# Patient Record
Sex: FEMALE | Race: BLACK OR AFRICAN AMERICAN | NOT HISPANIC OR LATINO | Employment: UNEMPLOYED | URBAN - METROPOLITAN AREA
[De-identification: names, ages, dates, MRNs, and addresses within clinical notes are randomized per-mention and may not be internally consistent; named-entity substitution may affect disease eponyms.]

---

## 2023-12-14 ENCOUNTER — HOSPITAL ENCOUNTER (EMERGENCY)
Facility: HOSPITAL | Age: 23
Discharge: HOME | End: 2023-12-15

## 2023-12-14 VITALS
HEIGHT: 66 IN | OXYGEN SATURATION: 99 % | TEMPERATURE: 97.2 F | SYSTOLIC BLOOD PRESSURE: 115 MMHG | RESPIRATION RATE: 16 BRPM | HEART RATE: 92 BPM | DIASTOLIC BLOOD PRESSURE: 66 MMHG | BODY MASS INDEX: 28.77 KG/M2 | WEIGHT: 179.01 LBS

## 2023-12-14 DIAGNOSIS — N30.00 ACUTE CYSTITIS WITHOUT HEMATURIA: Primary | ICD-10-CM

## 2023-12-14 DIAGNOSIS — Z11.3 SCREENING EXAMINATION FOR STD (SEXUALLY TRANSMITTED DISEASE): ICD-10-CM

## 2023-12-14 LAB — PREGNANCY TEST URINE, POC: NEGATIVE

## 2023-12-14 PROCEDURE — 81001 URINALYSIS AUTO W/SCOPE: CPT

## 2023-12-14 PROCEDURE — 81025 URINE PREGNANCY TEST: CPT

## 2023-12-14 PROCEDURE — 99284 EMERGENCY DEPT VISIT MOD MDM: CPT

## 2023-12-14 PROCEDURE — 99283 EMERGENCY DEPT VISIT LOW MDM: CPT

## 2023-12-14 PROCEDURE — 87086 URINE CULTURE/COLONY COUNT: CPT

## 2023-12-14 ASSESSMENT — COLUMBIA-SUICIDE SEVERITY RATING SCALE - C-SSRS
6. HAVE YOU EVER DONE ANYTHING, STARTED TO DO ANYTHING, OR PREPARED TO DO ANYTHING TO END YOUR LIFE?: NO
1. IN THE PAST MONTH, HAVE YOU WISHED YOU WERE DEAD OR WISHED YOU COULD GO TO SLEEP AND NOT WAKE UP?: NO
2. HAVE YOU ACTUALLY HAD ANY THOUGHTS OF KILLING YOURSELF?: NO

## 2023-12-15 LAB
APPEARANCE UR: ABNORMAL
BACTERIA #/AREA URNS AUTO: ABNORMAL /HPF
BILIRUB UR STRIP.AUTO-MCNC: NEGATIVE MG/DL
C TRACH RRNA SPEC QL NAA+PROBE: POSITIVE
CLUE CELLS SPEC QL WET PREP: NORMAL
COLOR UR: YELLOW
GLUCOSE UR STRIP.AUTO-MCNC: NEGATIVE MG/DL
HCV AB SER QL: NONREACTIVE
HIV 1+2 AB+HIV1 P24 AG SERPL QL IA: NONREACTIVE
HOLD SPECIMEN: NORMAL
KETONES UR STRIP.AUTO-MCNC: NEGATIVE MG/DL
LEUKOCYTE ESTERASE UR QL STRIP.AUTO: NEGATIVE
MUCOUS THREADS #/AREA URNS AUTO: ABNORMAL /LPF
N GONORRHOEA DNA SPEC QL PROBE+SIG AMP: NEGATIVE
NITRITE UR QL STRIP.AUTO: POSITIVE
PH UR STRIP.AUTO: 6 [PH]
PROT UR STRIP.AUTO-MCNC: NEGATIVE MG/DL
RBC # UR STRIP.AUTO: NEGATIVE /UL
RBC #/AREA URNS AUTO: ABNORMAL /HPF
SP GR UR STRIP.AUTO: 1.02
SQUAMOUS #/AREA URNS AUTO: ABNORMAL /HPF
T PALLIDUM AB SER QL: NONREACTIVE
T VAGINALIS SPEC QL WET PREP: NORMAL
TRICHOMONAS REFLEX COMMENT: NORMAL
UROBILINOGEN UR STRIP.AUTO-MCNC: 2 MG/DL
WBC #/AREA URNS AUTO: ABNORMAL /HPF
WBC VAG QL WET PREP: NORMAL
YEAST VAG QL WET PREP: NORMAL

## 2023-12-15 PROCEDURE — 86780 TREPONEMA PALLIDUM: CPT

## 2023-12-15 PROCEDURE — 87800 DETECT AGNT MULT DNA DIREC: CPT

## 2023-12-15 PROCEDURE — 86803 HEPATITIS C AB TEST: CPT

## 2023-12-15 PROCEDURE — 36415 COLL VENOUS BLD VENIPUNCTURE: CPT

## 2023-12-15 PROCEDURE — 87661 TRICHOMONAS VAGINALIS AMPLIF: CPT

## 2023-12-15 PROCEDURE — 87210 SMEAR WET MOUNT SALINE/INK: CPT

## 2023-12-15 PROCEDURE — 87389 HIV-1 AG W/HIV-1&-2 AB AG IA: CPT

## 2023-12-15 RX ORDER — NITROFURANTOIN 25; 75 MG/1; MG/1
100 CAPSULE ORAL 2 TIMES DAILY
Qty: 10 CAPSULE | Refills: 0 | Status: SHIPPED | OUTPATIENT
Start: 2023-12-15 | End: 2023-12-20

## 2023-12-15 NOTE — ED PROVIDER NOTES
"HPI    CC: STD concerns  HPI:   Tiburcio Roman is a 23 y.o.   female presenting with complaints of diffuse cramping lower abdominal pain and concerns for STD's. Patient report abdominal pain radiates into bilateral flanks that onset earlier this morning.  No associated fever, chills, diarrhea, constipation, dysuria, urinary frequency or urgency, changes in vaginal discharge, vaginal pain or itching.  No new genital lesions.  Patient is currently in a monogamous relationship with male partner but has been sexually active and therefore is requesting full STD testing with blood work as she believes \"men are disgusting and he could have been unfaithful\".  Reports remote history of gonorrhea/chlamydia 7 years ago which was successfully treated with antibiotics.  She also has a history of pyelonephritis 7 years ago and states current pain does not feel similar to this.  Her pain is not postprandial in nature and is not exacerbated by movement, urination or defecation.      PMHx:  PSH: reviewed per EMR  Allergies: Reviewed per EMR  Medications: Reviewed per EMR  FH: Noncontributory  Social Hx: Denies tobacco. Denies EtOH, illicit substance use.      ROS: All other systems were reviewed and negative.    Physical Exam:   GENERAL: Patient is well-appearing, cooperative. Vitals noted, NAD. Afebrile  HEAD: NC/AT. Neck is supple, full ROM intact.  EENMT: PERRL. EOMI, anicteric sclera. Hearing grossly intact. MMM. Normal phonation.  CV: Regular rate & rhythm. S1/S2 present. No murmur, gallops or rubs.  PULM: CTAB with normal respiratory effort. No wheezes, rales or rhonchi.  ABDOMEN: Soft, NTTP, non-distended. No peritoneal signs. BS+ x 4. No HSM or pulsatile masses.  : No CVA tenderness or reproducible suprapubic pain. Chaperone present for pelvic exam.      External exam unremarkable, no evidence of external excoriation, rash, lesions, or vesicles.       Internal speculum exam reveals closed os with scant vaginal " discharge within vaginal canal. No active bleeding, edema or mucopurulent discharge. Mucosa uniformly pink without      internal lesions or vesicles.      Bimanual is normal without adnexal pain, masses or cervical motion tenderness. No inguinal LAD.  EXTREMITIES: WWP, 2+ bilateral RPs and DPPs. No pitting LE edema.  SKIN: Intact. No rash or lesions. Cap refill <2s.  NEURO: AA&Ox3, Speech fluent. No focal deficits identified. Normal gait, Spontaneously ENNIS x 4. Answering questions appropriately.     -------------------------------------------------------------------------------------------  ED Course & MDM   Triage notes and vital signs were reviewed. History and physical exam were performed. I also reviewed recent and relevant outside records for thorough HPI.    Medical Decision Making  This is a 23 year old  female presenting with abdominal cramping radiating into bilateral flanks and concerns for pregnancy. Patient also expressed concerns for possible STD exposure and is therefore requesting STI testing today while she is here. Aside from pelvic pain she is currently asymptomatic and in a monogamous relationship with male partner. VSS on arrival. Patient is non-toxic appearing and in no apparent distress. Negative CVAT and no suprapubic pain to palpation. Based on clinical presentation, patient's complaints are most concerning for gynecological/genitourinary pathology. Low concern for intraabdominal process given benign abdomen and absence of systemic complaints. Will obtain UA, pelvic swabs for STI's and additional blood work for HIV, Hep-C and Syphillis. Patient is not interested in empiric treatment for STI's at this point in time.    Amount and/or Complexity of Data Reviewed  Labs: ordered.     Details: UA appears infected given presence of nitrite, WBC and 1+ bacteria on microscopy. Patient was informed and will be discharge home with prescription for Macrobid.    Risk  Prescription drug  management.      Differential Diagnoses Considered include: UTI, STI cervicitis, BV, yeast vaginosis, IUP vs. Ectopic    ED course:  Patient was informed that urine pregnancy is negative, therefore low suspicion for ectopic at this time.  Pelvic swabs for wet prep and GC/chlamydia were obtained. She is not interested in empiric STD treatment. UA appeared infected given presence of nitrite and WBC with 1+ bacteria seen on microscopy. She was informed and will be discharged home with prescription for Macrobid. Patient does not wish to wait for wet-prep results and is requesting discharge at this point in time. She was advised to avoid sexual intercourse until notified about STI results and if positive, wait for 1 week following treatment completion to avoid reinfection. Patient voiced understanding. ED return precautions were discussed and provided at time of discharge. Patient acknowledged their understanding and is amendable to the treatment plan. All discharge instructions explained to patient and all questions were answered.    Diagnoses as of 12/15/23 0043   Acute cystitis without hematuria   Screening examination for STD (sexually transmitted disease)            Disposition: Homgoing    Audrey Baker PA-C  [facility]    Disclaimer: This note was dictated by speech recognition. Minor errors in transcription may be present. Please call if questions.  -------------------------------------------------------------------------------------------       Audrey Baker PA-C  12/27/23 1875

## 2023-12-16 ENCOUNTER — TELEPHONE (OUTPATIENT)
Dept: PHARMACY | Facility: HOSPITAL | Age: 23
End: 2023-12-16

## 2023-12-16 DIAGNOSIS — A74.9 CHLAMYDIA: Primary | ICD-10-CM

## 2023-12-16 RX ORDER — DOXYCYCLINE 100 MG/1
100 CAPSULE ORAL 2 TIMES DAILY
Qty: 14 CAPSULE | Refills: 0 | Status: SHIPPED | OUTPATIENT
Start: 2023-12-16 | End: 2023-12-23

## 2023-12-16 NOTE — PROGRESS NOTES
EDPD Note: Initiation     Contacted Tiburcio VIVI Abel regarding a positive Chlamydia Trachomatis culture that was taken during their recent emergency room visit. I completed education with patient . The patient is not being treated appropriately. The following prescription was sent to the patient's preferred pharmacy. No further follow up needed from EDPD Team.     Drug: Doxycycline 100 mg  Si PO BID  Qty: 14 Tablets  Days Supply: 7 days    If there are any other questions for the ED Post-Discharge Culture Follow Up Team, please contact [phone]. Fax: [phone].    Pierre Dempsey, PharmD

## 2023-12-17 LAB — BACTERIA UR CULT: ABNORMAL

## 2023-12-18 LAB — T VAGINALIS RRNA SPEC QL NAA+PROBE: NEGATIVE

## 2023-12-19 ENCOUNTER — TELEPHONE (OUTPATIENT)
Dept: PHARMACY | Facility: HOSPITAL | Age: 23
End: 2023-12-19

## 2023-12-19 NOTE — PROGRESS NOTES
EDPD Note: Antibiotics Reviewed and Warranted    Contacted Mr./Mrs./Ms. Tiburcio Roman regarding a positive urine culture/result that was taken during their recent emergency room visit. I completed education with patient . The patient is being treated appropriately with Macrobid 100mg   (1 capsule PO BID x5 days).     Patient reports her Sx have very much improved. Counseled to complete full course of treatment, and to follow-up with PCP/UC/ER if not fully resolved upon completion of therapy.    Patient expressed understanding and had no further questions.    Susceptibility data from last 90 days.  Collected Specimen Info Organism Ampicillin Cefazolin Cefazolin (uncomplicated UTIs only) Ciprofloxacin Gentamicin Nitrofurantoin Piperacillin/Tazobactam Trimethoprim/Sulfamethoxazole   12/14/23 Urine from Clean Catch/Voided Escherichia coli S S S S S S S S        No further follow up needed from EDPD Team.     Ryanne Gómez [facility]

## 2024-02-03 ENCOUNTER — APPOINTMENT (OUTPATIENT)
Dept: CARDIOLOGY | Facility: HOSPITAL | Age: 24
End: 2024-02-03

## 2024-02-03 ENCOUNTER — HOSPITAL ENCOUNTER (EMERGENCY)
Facility: HOSPITAL | Age: 24
Discharge: HOME | End: 2024-02-03

## 2024-02-03 VITALS
WEIGHT: 130 LBS | OXYGEN SATURATION: 100 % | RESPIRATION RATE: 14 BRPM | SYSTOLIC BLOOD PRESSURE: 95 MMHG | DIASTOLIC BLOOD PRESSURE: 59 MMHG | BODY MASS INDEX: 20.89 KG/M2 | HEIGHT: 66 IN | TEMPERATURE: 98.2 F | HEART RATE: 89 BPM

## 2024-02-03 DIAGNOSIS — R55 NEAR SYNCOPE: Primary | ICD-10-CM

## 2024-02-03 DIAGNOSIS — E16.2 LOW BLOOD SUGAR: ICD-10-CM

## 2024-02-03 PROBLEM — F32.A DEPRESSION: Status: ACTIVE | Noted: 2024-02-03

## 2024-02-03 PROBLEM — F43.10 COMPLEX POSTTRAUMATIC STRESS DISORDER: Status: ACTIVE | Noted: 2024-02-03

## 2024-02-03 LAB
ALBUMIN SERPL BCP-MCNC: 3.6 G/DL (ref 3.4–5)
ALP SERPL-CCNC: 53 U/L (ref 33–110)
ALT SERPL W P-5'-P-CCNC: 7 U/L (ref 7–45)
AMPHETAMINES UR QL SCN: ABNORMAL
ANION GAP SERPL CALC-SCNC: 15 MMOL/L (ref 10–20)
APPEARANCE UR: CLEAR
AST SERPL W P-5'-P-CCNC: 12 U/L (ref 9–39)
BARBITURATES UR QL SCN: ABNORMAL
BASOPHILS # BLD AUTO: 0.04 X10*3/UL (ref 0–0.1)
BASOPHILS NFR BLD AUTO: 0.6 %
BENZODIAZ UR QL SCN: ABNORMAL
BILIRUB SERPL-MCNC: 0.4 MG/DL (ref 0–1.2)
BILIRUB UR STRIP.AUTO-MCNC: NEGATIVE MG/DL
BUN SERPL-MCNC: 10 MG/DL (ref 6–23)
BZE UR QL SCN: ABNORMAL
C TRACH RRNA SPEC QL NAA+PROBE: NEGATIVE
CALCIUM SERPL-MCNC: 8.5 MG/DL (ref 8.6–10.3)
CANNABINOIDS UR QL SCN: ABNORMAL
CHLORIDE SERPL-SCNC: 106 MMOL/L (ref 98–107)
CO2 SERPL-SCNC: 21 MMOL/L (ref 21–32)
COLOR UR: YELLOW
CREAT SERPL-MCNC: 0.71 MG/DL (ref 0.5–1.05)
EGFRCR SERPLBLD CKD-EPI 2021: >90 ML/MIN/1.73M*2
EOSINOPHIL # BLD AUTO: 0.17 X10*3/UL (ref 0–0.7)
EOSINOPHIL NFR BLD AUTO: 2.6 %
ERYTHROCYTE [DISTWIDTH] IN BLOOD BY AUTOMATED COUNT: 13.2 % (ref 11.5–14.5)
FENTANYL+NORFENTANYL UR QL SCN: ABNORMAL
GLUCOSE BLD MANUAL STRIP-MCNC: 114 MG/DL (ref 74–99)
GLUCOSE SERPL-MCNC: 70 MG/DL (ref 74–99)
GLUCOSE UR STRIP.AUTO-MCNC: NEGATIVE MG/DL
HCG UR QL IA.RAPID: NEGATIVE
HCT VFR BLD AUTO: 35.4 % (ref 36–46)
HGB BLD-MCNC: 11.7 G/DL (ref 12–16)
IMM GRANULOCYTES # BLD AUTO: 0.02 X10*3/UL (ref 0–0.7)
IMM GRANULOCYTES NFR BLD AUTO: 0.3 % (ref 0–0.9)
KETONES UR STRIP.AUTO-MCNC: NEGATIVE MG/DL
LEUKOCYTE ESTERASE UR QL STRIP.AUTO: NEGATIVE
LYMPHOCYTES # BLD AUTO: 3.49 X10*3/UL (ref 1.2–4.8)
LYMPHOCYTES NFR BLD AUTO: 53 %
MCH RBC QN AUTO: 29.3 PG (ref 26–34)
MCHC RBC AUTO-ENTMCNC: 33.1 G/DL (ref 32–36)
MCV RBC AUTO: 89 FL (ref 80–100)
MONOCYTES # BLD AUTO: 0.48 X10*3/UL (ref 0.1–1)
MONOCYTES NFR BLD AUTO: 7.3 %
N GONORRHOEA DNA SPEC QL PROBE+SIG AMP: NEGATIVE
NEUTROPHILS # BLD AUTO: 2.39 X10*3/UL (ref 1.2–7.7)
NEUTROPHILS NFR BLD AUTO: 36.2 %
NITRITE UR QL STRIP.AUTO: NEGATIVE
NRBC BLD-RTO: 0 /100 WBCS (ref 0–0)
OPIATES UR QL SCN: ABNORMAL
OXYCODONE+OXYMORPHONE UR QL SCN: ABNORMAL
PCP UR QL SCN: ABNORMAL
PH UR STRIP.AUTO: 5 [PH]
PLATELET # BLD AUTO: 267 X10*3/UL (ref 150–450)
POTASSIUM SERPL-SCNC: 3.7 MMOL/L (ref 3.5–5.3)
PROT SERPL-MCNC: 6.8 G/DL (ref 6.4–8.2)
PROT UR STRIP.AUTO-MCNC: NEGATIVE MG/DL
RBC # BLD AUTO: 4 X10*6/UL (ref 4–5.2)
RBC # UR STRIP.AUTO: NEGATIVE /UL
SODIUM SERPL-SCNC: 138 MMOL/L (ref 136–145)
SP GR UR STRIP.AUTO: 1.01
UROBILINOGEN UR STRIP.AUTO-MCNC: <2 MG/DL
WBC # BLD AUTO: 6.6 X10*3/UL (ref 4.4–11.3)

## 2024-02-03 PROCEDURE — 81003 URINALYSIS AUTO W/O SCOPE: CPT | Performed by: EMERGENCY MEDICINE

## 2024-02-03 PROCEDURE — 93005 ELECTROCARDIOGRAM TRACING: CPT

## 2024-02-03 PROCEDURE — 81025 URINE PREGNANCY TEST: CPT | Performed by: EMERGENCY MEDICINE

## 2024-02-03 PROCEDURE — 36415 COLL VENOUS BLD VENIPUNCTURE: CPT | Performed by: PHYSICIAN ASSISTANT

## 2024-02-03 PROCEDURE — 2500000004 HC RX 250 GENERAL PHARMACY W/ HCPCS (ALT 636 FOR OP/ED): Performed by: PHYSICIAN ASSISTANT

## 2024-02-03 PROCEDURE — 99283 EMERGENCY DEPT VISIT LOW MDM: CPT | Mod: 25

## 2024-02-03 PROCEDURE — 99284 EMERGENCY DEPT VISIT MOD MDM: CPT | Mod: 25

## 2024-02-03 PROCEDURE — 80307 DRUG TEST PRSMV CHEM ANLYZR: CPT | Performed by: PHYSICIAN ASSISTANT

## 2024-02-03 PROCEDURE — 84075 ASSAY ALKALINE PHOSPHATASE: CPT | Performed by: PHYSICIAN ASSISTANT

## 2024-02-03 PROCEDURE — 96360 HYDRATION IV INFUSION INIT: CPT

## 2024-02-03 PROCEDURE — 82947 ASSAY GLUCOSE BLOOD QUANT: CPT

## 2024-02-03 PROCEDURE — 85025 COMPLETE CBC W/AUTO DIFF WBC: CPT | Performed by: PHYSICIAN ASSISTANT

## 2024-02-03 PROCEDURE — 87800 DETECT AGNT MULT DNA DIREC: CPT | Mod: AHULAB | Performed by: PHYSICIAN ASSISTANT

## 2024-02-03 RX ADMIN — SODIUM CHLORIDE 1000 ML: 9 INJECTION, SOLUTION INTRAVENOUS at 05:14

## 2024-02-03 ASSESSMENT — PAIN SCALES - GENERAL
PAINLEVEL_OUTOF10: 0 - NO PAIN

## 2024-02-03 ASSESSMENT — COLUMBIA-SUICIDE SEVERITY RATING SCALE - C-SSRS
6. HAVE YOU EVER DONE ANYTHING, STARTED TO DO ANYTHING, OR PREPARED TO DO ANYTHING TO END YOUR LIFE?: NO
2. HAVE YOU ACTUALLY HAD ANY THOUGHTS OF KILLING YOURSELF?: NO
1. IN THE PAST MONTH, HAVE YOU WISHED YOU WERE DEAD OR WISHED YOU COULD GO TO SLEEP AND NOT WAKE UP?: NO

## 2024-02-03 ASSESSMENT — PAIN - FUNCTIONAL ASSESSMENT
PAIN_FUNCTIONAL_ASSESSMENT: 0-10
PAIN_FUNCTIONAL_ASSESSMENT: 0-10

## 2024-02-03 NOTE — ED TRIAGE NOTES
Pt arrives to ED via EMS with c/o near syncopal episode at work. Endorses dizziness and nausea currently. Pt slurring words during triage, denies etoh use tonight however does endorse marijuana use around 1800 and state she does not usually use marijuana. Pt denies hitting head, CP, SOB, numbness/tingling, HA.

## 2024-02-03 NOTE — ED PROVIDER NOTES
Chief Complaint   Patient presents with   • near syncope     HPI:   Tiburcio Roman is an 23 y.o. female with history of PTSD that presents to the ED via EMS for complaint of near syncopal episode while at work.  Patient states that she felt lightheaded and nauseous.  She went to the break room and said she felt like she was going to pass out.  She denies syncope.  Denies alcohol use.  Admits to using marijuana earlier today.  Denies chest pain or shortness of breath prior to or after the syncopal episode.  Denies tunnel vision.  Denies headache, vision changes, double vision, blurred vision, speech changes, chest pain, shortness of breath, neck pain, fever, chills, abdominal pain, dysuria, hematuria.  LMP 12/24/2023.  She says they are irregular due to her birth control.  Denies chance of pregnancy.  Is requesting to be tested for chlamydia to ensure test of cure following treatment last month.  Denies trauma or injury.  Says she does not think she ate or drank enough today.    Medications:  Soc HX: Weekly alcohol use.  Occasional black and mild use.  Frequent marijuana use.  Have 2 children  No Known Allergies:   Past Medical History:   Diagnosis Date   • Abdominal tenderness, unspecified site 01/24/2020    Abdominal tenderness   • Acute vaginitis 05/23/2020    Bacterial vaginosis   • Encounter for gynecological examination (general) (routine) without abnormal findings 07/22/2019    Encounter for well woman exam with routine gynecological exam   • Encounter for immunization 04/13/2018    Immunization due   • Encounter for other general counseling and advice on contraception 01/15/2019    Encounter for counseling regarding contraception   • Encounter for pregnancy test, result negative 01/15/2019    Urine pregnancy test negative   • Encounter for pregnancy test, result positive 03/06/2020    Positive pregnancy test   • Encounter for screening for diabetes mellitus 07/09/2020    Screening for diabetes mellitus  (DM)   • Encounter for screening for infections with a predominantly sexual mode of transmission 06/03/2020    Routine screening for STI (sexually transmitted infection)   • Encounter for screening for infections with a predominantly sexual mode of transmission 09/12/2019    Screen for STD (sexually transmitted disease)   • Encounter for surveillance of implantable subdermal contraceptive 11/13/2018    Nexplanon removal   • Encounter for surveillance of injectable contraceptive 07/22/2019    Encounter for Depo-Provera contraception   • Other conditions influencing health status 04/13/2018    Encounter for contraceptive management, unspecified contraceptive encounter type   • Other problems related to lifestyle 11/13/2018    Other problems related to lifestyle   • Person with feared health complaint in whom no diagnosis is made 01/24/2020    Concern about STD in female without diagnosis   • Personal history of other diseases of the digestive system 07/09/2020    History of constipation   • Personal history of other diseases of the female genital tract 05/20/2020    History of vaginal discharge   • Personal history of other diseases of the female genital tract     History of vaginal bleeding   • Personal history of other diseases of the female genital tract 01/24/2020    History of abnormal uterine bleeding   • Personal history of other endocrine, nutritional and metabolic disease 03/05/2019    History of vitamin D deficiency   • Personal history of other infectious and parasitic diseases 03/05/2019    History of trichomoniasis   • Personal history of other infectious and parasitic diseases     History of gonorrhea   • Personal history of other mental and behavioral disorders     History of depression   • Personal history of other mental and behavioral disorders 03/05/2019    History of depression   • Personal history of urinary (tract) infections 04/13/2018    History of urinary tract infection     History reviewed.  No pertinent surgical history.  No family history on file.     Physical Exam  Vitals and nursing note reviewed.   Constitutional:       General: She is not in acute distress.     Appearance: Normal appearance. She is normal weight. She is not ill-appearing or toxic-appearing.   HENT:      Head: Normocephalic and atraumatic.      Right Ear: External ear normal.      Left Ear: External ear normal.      Nose: Nose normal.      Mouth/Throat:      Mouth: Mucous membranes are moist.      Pharynx: No oropharyngeal exudate or posterior oropharyngeal erythema.   Eyes:      Extraocular Movements: Extraocular movements intact.      Pupils: Pupils are equal, round, and reactive to light.      Comments: No pain or dizziness with eye movement.  No nystagmus..  Normal test of skew   Cardiovascular:      Rate and Rhythm: Normal rate and regular rhythm.      Pulses: Normal pulses.      Heart sounds: No murmur heard.  Pulmonary:      Effort: Pulmonary effort is normal. No respiratory distress.      Breath sounds: Normal breath sounds.   Abdominal:      General: Bowel sounds are normal.      Palpations: Abdomen is soft.      Tenderness: There is no abdominal tenderness. There is no guarding or rebound.   Musculoskeletal:         General: No deformity or signs of injury. Normal range of motion.      Cervical back: Normal range of motion.   Lymphadenopathy:      Cervical: No cervical adenopathy.   Skin:     General: Skin is warm and dry.      Capillary Refill: Capillary refill takes less than 2 seconds.      Findings: No bruising or rash.   Neurological:      General: No focal deficit present.      Mental Status: She is alert.      Cranial Nerves: No cranial nerve deficit.      Sensory: No sensory deficit.      Motor: No weakness.      Coordination: Coordination normal.      Comments: NIH 0.  Van negative.  Normal finger-to-nose testing.   Psychiatric:         Mood and Affect: Mood normal.         Behavior: Behavior normal.     VS: As  documented in the triage note and EMR flowsheet from this visit were reviewed.    EKG INTERPRETATION:      Personally Reviewed      Rhythm:  Normal sinus rhythm      Rate: 73 bpm      Axis: Normal axis      Intervals:  Normal OK interval 184 ms     QRS Complex:  Normal      ST Segment: T wave inversion V2     QT Interval: QTc 440 ms      Compared with Prior: No MUSE       Medical Decision Making:   ED Course as of 02/03/24 0554   Sat Feb 03, 2024   0443 Vitals Reviewed: Afebrile. Normotensive. Not tachycardic nor tachypneic. No hypoxia.   [KA]   0504 Patient is a well-appearing 23-year-old female presents to the ED for near syncope.  EOMI.  PERRL.  LCTA.  RRR.  Abdomen soft, nontender, nondistended.  Symmetrical strength and sensation bilaterally.  No focal deficits.  Do not suspect central etiology.  Will obtain labs to assess for dehydration, electrolyte abnormality, anemia.  Urine hCG negative.  Patient to be given a bolus of normal saline.  She is requesting to be tested for gonorrhea and chlamydia to ensure test of cure because she was treated for chlamydia last month. [KA]   0550 I personally reviewed labs.  CBC shows normocytic anemia.  hCG negative.  CMP shows blood glucose of 70; likely that this may be the etiology of her symptoms.  BUN to creatinine ratio is 14, normal.  Renal function normal.  Electrolytes normal.  Tox screen positive for cannabinoids.  hCG negative.  Patient given aquilino crackers and juice.  Does report feeling better following IV fluids.  Discussed lab findings with her.  Recommended she stay well-hydrated, get plenty of rest.  Provided with work note. [KA]      ED Course User Index  [KA] Bertha Montoya PA-C         Diagnoses as of 02/03/24 0554   Near syncope   Low blood sugar   Escalation of Care: Appropriate for outpatient management     Social Determinants of Health: Limited financial resources, only has Medicaid for her children, not herself so she says she cannot see primary  care providers in she has to pay $80 every time she goes to the hospital which is too expensive for her.  Food insecurity  Counseling: Spoke with the patient and discussed today´s findings, in addition to providing specific details for the plan of care and expected course.  Patient was given the opportunity to ask questions.    Discussed return precautions and importance of follow-up.  Advised to follow-up with ECP.  Advised to return to the ED for changing or worsening symptoms, new symptoms, complaint specific precautions, and precautions listed on the discharge paperwork.  Educated on the common potential side effects of medications prescribed.    I advised the patient that the emergency evaluation and treatment provided today doesn't end their need for medical care. It is very important that they follow-up with their primary care provider or other specialist as instructed.    The plan of care was mutually agreed upon with the patient. The patient and/or family were given the opportunity to ask questions. All questions asked today in the ED were answered to the best of my ability with today's information.    I specifically advised the patient to return to the ED for changing or worsening symptoms, worrisome new symptoms, or for any complaint specific precautions listed on the discharge paperwork.    This patient was cared for in the setting of nationwide stress on resources and staffing.    This report was transcribed using voice recognition software.  Every effort was made to ensure accuracy, however, inadvertently computerized transcription errors may be present.       Bertha Montoya PA-C  02/03/24 0591

## 2024-02-03 NOTE — Clinical Note
Tiburcio Roman was seen and treated in our emergency department on 2/3/2024.  She may return to work on 02/05/2024.       If you have any questions or concerns, please don't hesitate to call.      Bertha Montoya PA-C

## 2024-02-03 NOTE — DISCHARGE INSTRUCTIONS
Follow-up with primary care provider.  Make sure that you eat light snacks throughout the day at work so that this does not happen again.  Stay well-hydrated.  Get plenty of rest.  Return to ER for any new or worsening symptoms.

## 2024-02-06 LAB
ATRIAL RATE: 73 BPM
P AXIS: 74 DEGREES
P OFFSET: 183 MS
P ONSET: 129 MS
PR INTERVAL: 184 MS
Q ONSET: 221 MS
QRS COUNT: 12 BEATS
QRS DURATION: 92 MS
QT INTERVAL: 400 MS
QTC CALCULATION(BAZETT): 440 MS
QTC FREDERICIA: 427 MS
R AXIS: 41 DEGREES
T AXIS: 56 DEGREES
T OFFSET: 421 MS
VENTRICULAR RATE: 73 BPM

## 2024-07-13 ENCOUNTER — HOSPITAL ENCOUNTER (EMERGENCY)
Facility: HOSPITAL | Age: 24
Discharge: HOME | End: 2024-07-13
Attending: STUDENT IN AN ORGANIZED HEALTH CARE EDUCATION/TRAINING PROGRAM
Payer: COMMERCIAL

## 2024-07-13 VITALS
DIASTOLIC BLOOD PRESSURE: 64 MMHG | BODY MASS INDEX: 19.62 KG/M2 | SYSTOLIC BLOOD PRESSURE: 105 MMHG | WEIGHT: 125 LBS | RESPIRATION RATE: 16 BRPM | HEART RATE: 88 BPM | HEIGHT: 67 IN | OXYGEN SATURATION: 100 % | TEMPERATURE: 97.5 F

## 2024-07-13 DIAGNOSIS — T83.511A URINARY TRACT INFECTION ASSOCIATED WITH CATHETERIZATION OF URINARY TRACT, UNSPECIFIED INDWELLING URINARY CATHETER TYPE, INITIAL ENCOUNTER (CMS-HCC): ICD-10-CM

## 2024-07-13 DIAGNOSIS — Z20.2 STD EXPOSURE: Primary | ICD-10-CM

## 2024-07-13 DIAGNOSIS — Z30.9 ENCOUNTER FOR CONTRACEPTIVE MANAGEMENT, UNSPECIFIED TYPE: ICD-10-CM

## 2024-07-13 DIAGNOSIS — N39.0 URINARY TRACT INFECTION ASSOCIATED WITH CATHETERIZATION OF URINARY TRACT, UNSPECIFIED INDWELLING URINARY CATHETER TYPE, INITIAL ENCOUNTER (CMS-HCC): ICD-10-CM

## 2024-07-13 LAB
APPEARANCE UR: CLEAR
BACTERIA #/AREA URNS AUTO: ABNORMAL /HPF
BILIRUB UR STRIP.AUTO-MCNC: NEGATIVE MG/DL
C TRACH RRNA SPEC QL NAA+PROBE: NEGATIVE
CLUE CELLS SPEC QL WET PREP: PRESENT
COLOR UR: ABNORMAL
GLUCOSE UR STRIP.AUTO-MCNC: NORMAL MG/DL
HIV 1+2 AB+HIV1 P24 AG SERPL QL IA: NONREACTIVE
HOLD SPECIMEN: NORMAL
KETONES UR STRIP.AUTO-MCNC: ABNORMAL MG/DL
LEUKOCYTE ESTERASE UR QL STRIP.AUTO: ABNORMAL
MUCOUS THREADS #/AREA URNS AUTO: ABNORMAL /LPF
N GONORRHOEA DNA SPEC QL PROBE+SIG AMP: NEGATIVE
NITRITE UR QL STRIP.AUTO: NEGATIVE
PH UR STRIP.AUTO: 5.5 [PH]
PREGNANCY TEST URINE, POC: NEGATIVE
PROT UR STRIP.AUTO-MCNC: NEGATIVE MG/DL
RBC # UR STRIP.AUTO: NEGATIVE /UL
RBC #/AREA URNS AUTO: ABNORMAL /HPF
SP GR UR STRIP.AUTO: 1.02
SQUAMOUS #/AREA URNS AUTO: ABNORMAL /HPF
T VAGINALIS SPEC QL WET PREP: ABNORMAL
TREPONEMA PALLIDUM IGG+IGM AB [PRESENCE] IN SERUM OR PLASMA BY IMMUNOASSAY: NONREACTIVE
TRICHOMONAS REFLEX COMMENT: ABNORMAL
UROBILINOGEN UR STRIP.AUTO-MCNC: NORMAL MG/DL
WBC #/AREA URNS AUTO: ABNORMAL /HPF
WBC VAG QL WET PREP: ABNORMAL
YEAST VAG QL WET PREP: ABNORMAL

## 2024-07-13 PROCEDURE — 86780 TREPONEMA PALLIDUM: CPT | Performed by: STUDENT IN AN ORGANIZED HEALTH CARE EDUCATION/TRAINING PROGRAM

## 2024-07-13 PROCEDURE — 87086 URINE CULTURE/COLONY COUNT: CPT | Performed by: STUDENT IN AN ORGANIZED HEALTH CARE EDUCATION/TRAINING PROGRAM

## 2024-07-13 PROCEDURE — 2500000001 HC RX 250 WO HCPCS SELF ADMINISTERED DRUGS (ALT 637 FOR MEDICARE OP): Mod: SE | Performed by: STUDENT IN AN ORGANIZED HEALTH CARE EDUCATION/TRAINING PROGRAM

## 2024-07-13 PROCEDURE — 87491 CHLMYD TRACH DNA AMP PROBE: CPT | Performed by: STUDENT IN AN ORGANIZED HEALTH CARE EDUCATION/TRAINING PROGRAM

## 2024-07-13 PROCEDURE — 2500000005 HC RX 250 GENERAL PHARMACY W/O HCPCS: Mod: SE | Performed by: STUDENT IN AN ORGANIZED HEALTH CARE EDUCATION/TRAINING PROGRAM

## 2024-07-13 PROCEDURE — 81025 URINE PREGNANCY TEST: CPT | Performed by: STUDENT IN AN ORGANIZED HEALTH CARE EDUCATION/TRAINING PROGRAM

## 2024-07-13 PROCEDURE — 87210 SMEAR WET MOUNT SALINE/INK: CPT | Performed by: STUDENT IN AN ORGANIZED HEALTH CARE EDUCATION/TRAINING PROGRAM

## 2024-07-13 PROCEDURE — 99283 EMERGENCY DEPT VISIT LOW MDM: CPT

## 2024-07-13 PROCEDURE — 87186 SC STD MICRODIL/AGAR DIL: CPT | Performed by: STUDENT IN AN ORGANIZED HEALTH CARE EDUCATION/TRAINING PROGRAM

## 2024-07-13 PROCEDURE — 2500000004 HC RX 250 GENERAL PHARMACY W/ HCPCS (ALT 636 FOR OP/ED): Mod: SE | Performed by: STUDENT IN AN ORGANIZED HEALTH CARE EDUCATION/TRAINING PROGRAM

## 2024-07-13 PROCEDURE — 87661 TRICHOMONAS VAGINALIS AMPLIF: CPT | Performed by: STUDENT IN AN ORGANIZED HEALTH CARE EDUCATION/TRAINING PROGRAM

## 2024-07-13 PROCEDURE — 81001 URINALYSIS AUTO W/SCOPE: CPT | Performed by: STUDENT IN AN ORGANIZED HEALTH CARE EDUCATION/TRAINING PROGRAM

## 2024-07-13 PROCEDURE — 36415 COLL VENOUS BLD VENIPUNCTURE: CPT | Performed by: STUDENT IN AN ORGANIZED HEALTH CARE EDUCATION/TRAINING PROGRAM

## 2024-07-13 PROCEDURE — 87389 HIV-1 AG W/HIV-1&-2 AB AG IA: CPT | Performed by: STUDENT IN AN ORGANIZED HEALTH CARE EDUCATION/TRAINING PROGRAM

## 2024-07-13 PROCEDURE — 99284 EMERGENCY DEPT VISIT MOD MDM: CPT | Performed by: STUDENT IN AN ORGANIZED HEALTH CARE EDUCATION/TRAINING PROGRAM

## 2024-07-13 PROCEDURE — 96372 THER/PROPH/DIAG INJ SC/IM: CPT | Performed by: STUDENT IN AN ORGANIZED HEALTH CARE EDUCATION/TRAINING PROGRAM

## 2024-07-13 RX ORDER — ONDANSETRON 4 MG/1
4 TABLET, ORALLY DISINTEGRATING ORAL ONCE
Status: COMPLETED | OUTPATIENT
Start: 2024-07-13 | End: 2024-07-13

## 2024-07-13 RX ORDER — DOXYCYCLINE 100 MG/1
100 TABLET ORAL 2 TIMES DAILY
Qty: 14 TABLET | Refills: 0 | Status: SHIPPED | OUTPATIENT
Start: 2024-07-13 | End: 2024-07-15

## 2024-07-13 RX ORDER — SULFAMETHOXAZOLE AND TRIMETHOPRIM 800; 160 MG/1; MG/1
1 TABLET ORAL EVERY 12 HOURS
Qty: 10 TABLET | Refills: 0 | Status: SHIPPED | OUTPATIENT
Start: 2024-07-13 | End: 2024-07-17

## 2024-07-13 RX ORDER — DOXYCYCLINE HYCLATE 100 MG
100 TABLET ORAL ONCE
Status: COMPLETED | OUTPATIENT
Start: 2024-07-13 | End: 2024-07-13

## 2024-07-13 RX ORDER — CEFTRIAXONE 500 MG/1
500 INJECTION, POWDER, FOR SOLUTION INTRAMUSCULAR; INTRAVENOUS ONCE
Status: COMPLETED | OUTPATIENT
Start: 2024-07-13 | End: 2024-07-13

## 2024-07-13 RX ADMIN — ONDANSETRON 4 MG: 4 TABLET, ORALLY DISINTEGRATING ORAL at 05:21

## 2024-07-13 RX ADMIN — CEFTRIAXONE SODIUM 500 MG: 500 INJECTION, POWDER, FOR SOLUTION INTRAMUSCULAR; INTRAVENOUS at 06:58

## 2024-07-13 RX ADMIN — DOXYCYCLINE HYCLATE 100 MG: 100 TABLET, COATED ORAL at 06:58

## 2024-07-13 ASSESSMENT — LIFESTYLE VARIABLES
EVER HAD A DRINK FIRST THING IN THE MORNING TO STEADY YOUR NERVES TO GET RID OF A HANGOVER: NO
EVER FELT BAD OR GUILTY ABOUT YOUR DRINKING: NO
HAVE YOU EVER FELT YOU SHOULD CUT DOWN ON YOUR DRINKING: NO
HAVE PEOPLE ANNOYED YOU BY CRITICIZING YOUR DRINKING: NO
TOTAL SCORE: 0

## 2024-07-13 ASSESSMENT — PAIN DESCRIPTION - PROGRESSION: CLINICAL_PROGRESSION: NOT CHANGED

## 2024-07-13 ASSESSMENT — PAIN - FUNCTIONAL ASSESSMENT: PAIN_FUNCTIONAL_ASSESSMENT: 0-10

## 2024-07-13 ASSESSMENT — COLUMBIA-SUICIDE SEVERITY RATING SCALE - C-SSRS
1. IN THE PAST MONTH, HAVE YOU WISHED YOU WERE DEAD OR WISHED YOU COULD GO TO SLEEP AND NOT WAKE UP?: NO
6. HAVE YOU EVER DONE ANYTHING, STARTED TO DO ANYTHING, OR PREPARED TO DO ANYTHING TO END YOUR LIFE?: NO
2. HAVE YOU ACTUALLY HAD ANY THOUGHTS OF KILLING YOURSELF?: NO

## 2024-07-13 NOTE — ED PROVIDER NOTES
"Limitations to History: none  External Records Reviewed: recent notes reviewed showing positive chlamydia 12/23  Independent Historians: patient    HPI  Tiburcio Roman is a 23 y.o. female with a history of depression and PTSD presenting to the ED with the chief complaint \"feeling like I'm going to throw up\", abdominal pain, and wanting tested for STIs. She states that yesterday morning she felt nauseous and a little lightheaded and the nausea has persisted in waves. She had an associated headache yesterday as well that resolved after she fell asleep. She also states that for about a week she has noticed a strong smell to her urine, but denies any dysuria or hematuria. She tried increasing her water intake as well as cranberry juice without any improvement. Additionally, she states that she has two labial bumps. One that has been present for over a week and one that she noticed two days ago. She thought one was an ingrown hair, but states that the newer one does not look at the same as the one she thought was an ingrown hair. Denies any tenderness to either bump and they are without discharge. She also states that today she noticed some abdominal pain that she describes as being sharp with a bloated feeling. She rates that pain currently as a 0/10 as it comes and goes.  Denies any back pain, fever, shortness of breath, syncope, or any vaginal discharge.     PMH  Past Medical History:   Diagnosis Date    Abdominal tenderness, unspecified site 01/24/2020    Abdominal tenderness    Acute vaginitis 05/23/2020    Bacterial vaginosis    Encounter for gynecological examination (general) (routine) without abnormal findings 07/22/2019    Encounter for well woman exam with routine gynecological exam    Encounter for immunization 04/13/2018    Immunization due    Encounter for other general counseling and advice on contraception 01/15/2019    Encounter for counseling regarding contraception    Encounter for pregnancy test, " result negative 01/15/2019    Urine pregnancy test negative    Encounter for pregnancy test, result positive ([facility]) 03/06/2020    Positive pregnancy test    Encounter for screening for diabetes mellitus 07/09/2020    Screening for diabetes mellitus (DM)    Encounter for screening for infections with a predominantly sexual mode of transmission 06/03/2020    Routine screening for STI (sexually transmitted infection)    Encounter for screening for infections with a predominantly sexual mode of transmission 09/12/2019    Screen for STD (sexually transmitted disease)    Encounter for surveillance of implantable subdermal contraceptive 11/13/2018    Nexplanon removal    Encounter for surveillance of injectable contraceptive 07/22/2019    Encounter for Depo-Provera contraception    Other conditions influencing health status 04/13/2018    Encounter for contraceptive management, unspecified contraceptive encounter type    Other problems related to lifestyle 11/13/2018    Other problems related to lifestyle    Person with feared health complaint in whom no diagnosis is made 01/24/2020    Concern about STD in female without diagnosis    Personal history of other diseases of the digestive system 07/09/2020    History of constipation    Personal history of other diseases of the female genital tract 05/20/2020    History of vaginal discharge    Personal history of other diseases of the female genital tract     History of vaginal bleeding    Personal history of other diseases of the female genital tract 01/24/2020    History of abnormal uterine bleeding    Personal history of other endocrine, nutritional and metabolic disease 03/05/2019    History of vitamin D deficiency    Personal history of other infectious and parasitic diseases 03/05/2019    History of trichomoniasis    Personal history of other infectious and parasitic diseases     History of gonorrhea    Personal history of other mental and behavioral disorders      "History of depression    Personal history of other mental and behavioral disorders 03/05/2019    History of depression    Personal history of urinary (tract) infections 04/13/2018    History of urinary tract infection       Meds  No current outpatient medications    Allergies  No Known Allergies     SHx       ------------------------------------------------------------------------------------------------------------------------------------------    /64   Pulse 88   Temp 36.4 °C (97.5 °F) (Temporal)   Resp 16   Ht 1.689 m (5' 6.5\")   Wt 56.7 kg (125 lb)   SpO2 100%   BMI 19.87 kg/m²     Physical Exam  Vitals reviewed. Exam conducted with a chaperone present.   Constitutional:       General: She is not in acute distress.     Appearance: Normal appearance. She is not ill-appearing or toxic-appearing.   HENT:      Head: Normocephalic and atraumatic.      Nose: Nose normal.      Mouth/Throat:      Mouth: Mucous membranes are moist.   Cardiovascular:      Rate and Rhythm: Normal rate.   Pulmonary:      Effort: Pulmonary effort is normal.   Abdominal:      General: Abdomen is flat.      Palpations: Abdomen is soft. There is no mass.      Tenderness: There is abdominal tenderness (periumbilical). There is no guarding or rebound.      Hernia: No hernia is present.   Genitourinary:     Exam position: Lithotomy position.      Pubic Area: No rash.       Labia:         Right: No rash.         Left: No rash.       Vagina: Normal.      Cervix: No lesion, erythema or cervical bleeding.   Skin:     General: Skin is warm and dry.   Neurological:      General: No focal deficit present.      Mental Status: She is alert.   Psychiatric:         Mood and Affect: Mood normal.         Behavior: Behavior normal.         Thought Content: Thought content normal.          ------------------------------------------------------------------------------------------------------------------------------------------    Medical Decision " Making: Pt is a 24 yo F presenting to the ED with nausea, abdominal pain, and concerns for UTI and STI. She is hemodynamically stable and vital signs are within normal limits. Zofran was given for nausea management. UA showed 25 leukocyte esterase, 1+bacteria, and 6-10 WBC correlating with urinary symptoms indicating a mild UTI. Pregnancy test was negative. Cervical exam was without any erythema or tenderness. She would like to empirically start antibiotics for STI, certriaxone was given and doxycycline prescription was written. She was given bactrim for UTI management. Discharge plan was discussed with patient as well as return precautions and she was agreeable.       Diagnoses as of 07/13/24 0635   Encounter for contraceptive management, unspecified type   Urinary tract infection associated with catheterization of urinary tract, unspecified indwelling urinary catheter type, initial encounter (CMS-[facility])   STD exposure       Discussed with: attending emergency medicine physician       Stefani LoPresti, OMS IV Stefani Lopresti  07/13/24 0642

## 2024-07-13 NOTE — DISCHARGE INSTRUCTIONS
You were seen in the ED for possible STI and UTI. You were given a dose of antibiotics in the ED and sent with a prescription for antibiotics as well. Please return to the ED for any fever, painful urination, blood in your urine, or any other concerning symptoms.

## 2024-07-14 LAB
BACTERIA UR CULT: ABNORMAL
T VAGINALIS RRNA SPEC QL NAA+PROBE: NEGATIVE

## 2024-07-15 ENCOUNTER — TELEPHONE (OUTPATIENT)
Dept: PHARMACY | Facility: HOSPITAL | Age: 24
End: 2024-07-15
Payer: COMMERCIAL

## 2024-07-15 DIAGNOSIS — B96.89 BACTERIAL VAGINOSIS: Primary | ICD-10-CM

## 2024-07-15 DIAGNOSIS — N76.0 BACTERIAL VAGINOSIS: Primary | ICD-10-CM

## 2024-07-15 LAB — BACTERIA UR CULT: ABNORMAL

## 2024-07-15 RX ORDER — METRONIDAZOLE 500 MG/1
500 TABLET ORAL 2 TIMES DAILY
Qty: 14 TABLET | Refills: 0 | Status: SHIPPED | OUTPATIENT
Start: 2024-07-15 | End: 2024-07-22

## 2024-07-15 NOTE — PROGRESS NOTES
EDPD Note: Initiation     Contacted Tiburcio VIVI Abel regarding a positive bacterial vaginosis culture/result that was taken during their recent emergency room visit. I completed education with patient . The patient is not being treated appropriately.  Encouraged patient to inform their sex partners; but it is not necessary for male partners to seek treatment. Advised patient to abstain from sex until they complete treatment. Education on safe sex practices, such as using condoms, was reviewed. .  Patient verbalized understanding and had no further questions or concerns. The following prescription was sent to the patient's preferred pharmacy. No further follow up needed from EDPD Team.  Advised patient to finish full course of antibiotic and follow up with PCP or urgent care if symptoms do not completely resolve.      Told patient to discontinue Doxycyline due to negative Chlamydia result. Urine culture still pending at time of call. Patient was told to continue Bactrim DS BID and we will call if urine is positive.     Drug: Metronidazole 500mg   Si tab BID   Qty: 14  Days Supply: 7    If there are any other questions for the ED Post-Discharge Culture Follow Up Team, please contact [phone]. Fax: [phone].    Janice Rueda, PharmD

## 2024-07-17 ENCOUNTER — TELEPHONE (OUTPATIENT)
Dept: PHARMACY | Facility: HOSPITAL | Age: 24
End: 2024-07-17
Payer: COMMERCIAL

## 2024-07-17 DIAGNOSIS — N39.0 UTI (URINARY TRACT INFECTION), UNCOMPLICATED: Primary | ICD-10-CM

## 2024-07-17 RX ORDER — NITROFURANTOIN 25; 75 MG/1; MG/1
100 CAPSULE ORAL 2 TIMES DAILY
Qty: 10 CAPSULE | Refills: 0 | Status: SHIPPED | OUTPATIENT
Start: 2024-07-17 | End: 2024-07-22

## 2024-07-17 NOTE — PROGRESS NOTES
EDPD Note: Bug-Drug Mismatch    Contacted Mr./Mrs./Ms. Tiburcio Roman regarding a positive urine culture/result that was taken during their recent emergency room visit. I completed education with patient. The patient is not being treated appropriately with Bactrim DS. Patient was told to  Macrobid instead for UTI. Advised patient to finish full course of antibiotic and follow up with PCP or urgent care if symptoms do not completely resolve.      The following prescription was sent to the patient's preferred pharmacy. No further follow up needed from EDPD Team.   Drug: Macrobid 100mg   Si tab BID   Days Supply: 5    Susceptibility data from last 90 days.  Collected Specimen Info Organism Ampicillin Cefazolin Cefazolin (uncomplicated UTIs only) Ciprofloxacin Gentamicin Nitrofurantoin Piperacillin/Tazobactam Trimethoprim/Sulfamethoxazole   24 Urine from Clean Catch/Voided Escherichia coli  S  S  S  S  S  S  S  R       Janice Rueda, PharmD

## 2024-07-20 ENCOUNTER — HOSPITAL ENCOUNTER (EMERGENCY)
Facility: HOSPITAL | Age: 24
Discharge: HOME | End: 2024-07-20
Attending: STUDENT IN AN ORGANIZED HEALTH CARE EDUCATION/TRAINING PROGRAM
Payer: COMMERCIAL

## 2024-07-20 VITALS
SYSTOLIC BLOOD PRESSURE: 116 MMHG | WEIGHT: 125 LBS | TEMPERATURE: 97.9 F | HEART RATE: 77 BPM | DIASTOLIC BLOOD PRESSURE: 70 MMHG | HEIGHT: 66 IN | RESPIRATION RATE: 16 BRPM | BODY MASS INDEX: 20.09 KG/M2 | OXYGEN SATURATION: 100 %

## 2024-07-20 DIAGNOSIS — B96.89 BV (BACTERIAL VAGINOSIS): ICD-10-CM

## 2024-07-20 DIAGNOSIS — N39.0 URINARY TRACT INFECTION WITHOUT HEMATURIA, SITE UNSPECIFIED: ICD-10-CM

## 2024-07-20 DIAGNOSIS — N39.0 UTI (URINARY TRACT INFECTION), UNCOMPLICATED: ICD-10-CM

## 2024-07-20 DIAGNOSIS — R11.0 NAUSEA: Primary | ICD-10-CM

## 2024-07-20 DIAGNOSIS — B96.89 BACTERIAL VAGINOSIS: ICD-10-CM

## 2024-07-20 DIAGNOSIS — N76.0 BV (BACTERIAL VAGINOSIS): ICD-10-CM

## 2024-07-20 DIAGNOSIS — N76.0 BACTERIAL VAGINOSIS: ICD-10-CM

## 2024-07-20 PROCEDURE — 2500000001 HC RX 250 WO HCPCS SELF ADMINISTERED DRUGS (ALT 637 FOR MEDICARE OP)

## 2024-07-20 PROCEDURE — 99284 EMERGENCY DEPT VISIT MOD MDM: CPT | Performed by: STUDENT IN AN ORGANIZED HEALTH CARE EDUCATION/TRAINING PROGRAM

## 2024-07-20 PROCEDURE — 2500000005 HC RX 250 GENERAL PHARMACY W/O HCPCS

## 2024-07-20 PROCEDURE — 99283 EMERGENCY DEPT VISIT LOW MDM: CPT

## 2024-07-20 PROCEDURE — 2500000002 HC RX 250 W HCPCS SELF ADMINISTERED DRUGS (ALT 637 FOR MEDICARE OP, ALT 636 FOR OP/ED)

## 2024-07-20 RX ORDER — NITROFURANTOIN 25; 75 MG/1; MG/1
100 CAPSULE ORAL ONCE
Status: COMPLETED | OUTPATIENT
Start: 2024-07-20 | End: 2024-07-20

## 2024-07-20 RX ORDER — METRONIDAZOLE 500 MG/1
500 TABLET ORAL ONCE
Status: COMPLETED | OUTPATIENT
Start: 2024-07-20 | End: 2024-07-20

## 2024-07-20 RX ORDER — ONDANSETRON 4 MG/1
4 TABLET, ORALLY DISINTEGRATING ORAL ONCE
Status: COMPLETED | OUTPATIENT
Start: 2024-07-20 | End: 2024-07-20

## 2024-07-20 RX ADMIN — NITROFURANTOIN MONOHYDRATE/MACROCRYSTALS 100 MG: 25; 75 CAPSULE ORAL at 16:43

## 2024-07-20 RX ADMIN — ONDANSETRON 4 MG: 4 TABLET, ORALLY DISINTEGRATING ORAL at 16:43

## 2024-07-20 RX ADMIN — METRONIDAZOLE 500 MG: 500 TABLET ORAL at 16:43

## 2024-07-20 ASSESSMENT — COLUMBIA-SUICIDE SEVERITY RATING SCALE - C-SSRS
1. IN THE PAST MONTH, HAVE YOU WISHED YOU WERE DEAD OR WISHED YOU COULD GO TO SLEEP AND NOT WAKE UP?: NO
2. HAVE YOU ACTUALLY HAD ANY THOUGHTS OF KILLING YOURSELF?: NO
6. HAVE YOU EVER DONE ANYTHING, STARTED TO DO ANYTHING, OR PREPARED TO DO ANYTHING TO END YOUR LIFE?: NO

## 2024-07-20 NOTE — Clinical Note
Tiburcio Roman was seen and treated in our emergency department on 7/20/2024.  She may return to work on 07/21/2024.  Patient was seen on 07/13/2024 for similar symptoms.  Patient was seen again in the emergency department today 07/20/2024 for the same symptoms.     If you have any questions or concerns, please don't hesitate to call.      Ledy Gomes, DO

## 2024-07-20 NOTE — ED PROVIDER NOTES
History of Present Illness     History provided by: Patient  Limitations to History: None  External Records Reviewed with Brief Summary:     HPI:  Tiburcio Roman is a 23 y.o. female with a recent diagnosis of BV and urinary tract infection presenting to the emergency department today with concern for continued nausea and generalized weakness.  Patient was unable to  her prescription for antibiotics due to some concern with her insurance however states that that has been resolved and is in the process of getting her prescriptions filled.  Denies any associated fever, chills, abdominal pain, diarrhea, constipation.  Denies any episodes of vomiting however has felt nauseous.  No other associated signs or symptoms reported at this time.    Physical Exam   Triage vitals:  T 36.6 °C (97.9 °F)  HR 77  BP (!) 94/37  RR 16  O2 100 % None (Room air)    Physical Exam  Vitals and nursing note reviewed.   Constitutional:       General: She is not in acute distress.     Appearance: She is well-developed.   HENT:      Head: Normocephalic and atraumatic.   Eyes:      Conjunctiva/sclera: Conjunctivae normal.   Cardiovascular:      Rate and Rhythm: Normal rate and regular rhythm.      Heart sounds: No murmur heard.  Pulmonary:      Effort: Pulmonary effort is normal. No respiratory distress.      Breath sounds: Normal breath sounds.   Abdominal:      Palpations: Abdomen is soft.      Tenderness: There is no abdominal tenderness.   Musculoskeletal:         General: No swelling.      Cervical back: Neck supple.   Skin:     General: Skin is warm and dry.      Capillary Refill: Capillary refill takes less than 2 seconds.   Neurological:      General: No focal deficit present.      Mental Status: She is alert and oriented to person, place, and time.   Psychiatric:         Mood and Affect: Mood normal.          Medical Decision Making & ED Course   Medical Decision Makin y.o. female coming in with nausea secondary to  urinary tract infection and BV that is been untreated.  Patient afebrile, examination otherwise reassuring without any significant tenderness.  Vital signs upon arrival were reviewed, patient hemodynamically stable.  Patient presentation otherwise reassuring, ambulating well in the room, has her 3 children, ambulating walking around in the room without any difficulty.  Does not appear to be in significant distress.  Symptoms likely secondary to untreated urinary tract infection.  Patient given a dose of p.o. Zofran in addition to the first dose of antibiotics here in the emergency department.  Did speak to the patient about the importance of picking up her prescriptions, she states that she did not have insurance access, I offered using [employer] or other means to find her the most affordable options however she states that her insurance has been reinstated and she will go  her prescriptions after her visit today.  Patient has an OB/GYN appointment scheduled that she is aware of.  Patient's workup from 1 week ago was reviewed, patient had a urinary tract fraction and urine culture growing E. coli.  Patient was not pregnant at the time.  Notes that she is not pregnant now, notes that nothing is changed since her previous visit with the exception of some new nausea.  Notes that if she has any other new or concerning symptoms she should present back to the emergency department otherwise follow-up with her OB/GYN/primary care doctor.  Patient is agreeable with this plan.  Patient discharged home in stable condition.  ----      Differential diagnoses considered include but are not limited to: Nausea, urinary tract infection, pyelonephritis, pregnancy,     Social Determinants of Health which Significantly Impact Care: Difficulty paying for/obtaining medications The following actions were taken to address these social determinants: Offer to provide affordable pharmacies however the patient states that her insurance has  been reinstated significant for medications today after the visit.    EKG Independent Interpretation: See ED Course    Independent Result Review and Interpretation: See ED course    Chronic conditions affecting the patient's care: See HPI    The patient was discussed with the following consultants/services: None    Care Considerations: See MDM    ED Course:  Diagnoses as of 07/20/24 1706   Nausea   Urinary tract infection without hematuria, site unspecified   BV (bacterial vaginosis)     Disposition   As a result of the work-up, the patient was discharged home.  she was informed of her diagnosis and instructed to come back with any concerns or worsening of condition.  she and was agreeable to the plan as discussed above.  she was given the opportunity to ask questions.  All of the patient's questions were answered.    Procedures   Procedures      Seen and discussed with ED Attending  Ledy Gomes DO, PGY-2  Emergency Medicine     Ledy Gomes DO  Resident  07/20/24 2261

## 2024-07-20 NOTE — ED TRIAGE NOTES
Patient to ED with complaints of nausea without vomiting and weakness. Recently diagnosed with BV and UTI here. Given scripts- hasn't been able to get them due to insurance issues. No medical history.

## 2024-07-29 ENCOUNTER — APPOINTMENT (OUTPATIENT)
Dept: OBSTETRICS AND GYNECOLOGY | Facility: CLINIC | Age: 24
End: 2024-07-29

## 2024-07-29 ENCOUNTER — APPOINTMENT (OUTPATIENT)
Dept: OBSTETRICS AND GYNECOLOGY | Facility: HOSPITAL | Age: 24
End: 2024-07-29
Payer: COMMERCIAL

## 2024-07-30 ENCOUNTER — HOSPITAL ENCOUNTER (EMERGENCY)
Facility: HOSPITAL | Age: 24
Discharge: HOME | End: 2024-07-30
Payer: COMMERCIAL

## 2024-07-30 VITALS
OXYGEN SATURATION: 98 % | HEART RATE: 65 BPM | RESPIRATION RATE: 20 BRPM | BODY MASS INDEX: 20.09 KG/M2 | SYSTOLIC BLOOD PRESSURE: 100 MMHG | HEIGHT: 66 IN | TEMPERATURE: 97.9 F | DIASTOLIC BLOOD PRESSURE: 63 MMHG | WEIGHT: 125 LBS

## 2024-07-30 DIAGNOSIS — R11.2 NAUSEA AND VOMITING, UNSPECIFIED VOMITING TYPE: ICD-10-CM

## 2024-07-30 DIAGNOSIS — B34.9 VIRAL SYNDROME: ICD-10-CM

## 2024-07-30 DIAGNOSIS — E86.0 DEHYDRATION: Primary | ICD-10-CM

## 2024-07-30 LAB
APPEARANCE UR: ABNORMAL
BILIRUB UR STRIP.AUTO-MCNC: NEGATIVE MG/DL
COLOR UR: ABNORMAL
GLUCOSE UR STRIP.AUTO-MCNC: NORMAL MG/DL
HOLD SPECIMEN: NORMAL
KETONES UR STRIP.AUTO-MCNC: NEGATIVE MG/DL
LEUKOCYTE ESTERASE UR QL STRIP.AUTO: ABNORMAL
MUCOUS THREADS #/AREA URNS AUTO: NORMAL /LPF
NITRITE UR QL STRIP.AUTO: NEGATIVE
PH UR STRIP.AUTO: 6.5 [PH]
PREGNANCY TEST URINE, POC: NEGATIVE
PROT UR STRIP.AUTO-MCNC: ABNORMAL MG/DL
RBC # UR STRIP.AUTO: ABNORMAL /UL
RBC #/AREA URNS AUTO: NORMAL /HPF
SP GR UR STRIP.AUTO: 1.03
SQUAMOUS #/AREA URNS AUTO: NORMAL /HPF
UROBILINOGEN UR STRIP.AUTO-MCNC: NORMAL MG/DL
WBC #/AREA URNS AUTO: NORMAL /HPF

## 2024-07-30 PROCEDURE — 2500000005 HC RX 250 GENERAL PHARMACY W/O HCPCS: Mod: SE

## 2024-07-30 PROCEDURE — 87086 URINE CULTURE/COLONY COUNT: CPT

## 2024-07-30 PROCEDURE — 2500000001 HC RX 250 WO HCPCS SELF ADMINISTERED DRUGS (ALT 637 FOR MEDICARE OP): Mod: SE

## 2024-07-30 PROCEDURE — 99284 EMERGENCY DEPT VISIT MOD MDM: CPT | Performed by: EMERGENCY MEDICINE

## 2024-07-30 PROCEDURE — 81025 URINE PREGNANCY TEST: CPT

## 2024-07-30 PROCEDURE — 81003 URINALYSIS AUTO W/O SCOPE: CPT

## 2024-07-30 PROCEDURE — 99283 EMERGENCY DEPT VISIT LOW MDM: CPT

## 2024-07-30 RX ORDER — IBUPROFEN 600 MG/1
600 TABLET ORAL ONCE
Status: COMPLETED | OUTPATIENT
Start: 2024-07-30 | End: 2024-07-30

## 2024-07-30 RX ORDER — ONDANSETRON 4 MG/1
4 TABLET, ORALLY DISINTEGRATING ORAL EVERY 8 HOURS PRN
Qty: 9 TABLET | Refills: 0 | Status: SHIPPED | OUTPATIENT
Start: 2024-07-30 | End: 2024-08-02

## 2024-07-30 RX ORDER — ONDANSETRON 4 MG/1
4 TABLET, ORALLY DISINTEGRATING ORAL ONCE
Status: COMPLETED | OUTPATIENT
Start: 2024-07-30 | End: 2024-07-30

## 2024-07-30 ASSESSMENT — LIFESTYLE VARIABLES
HAVE PEOPLE ANNOYED YOU BY CRITICIZING YOUR DRINKING: NO
EVER FELT BAD OR GUILTY ABOUT YOUR DRINKING: NO
HAVE YOU EVER FELT YOU SHOULD CUT DOWN ON YOUR DRINKING: NO
TOTAL SCORE: 0
EVER HAD A DRINK FIRST THING IN THE MORNING TO STEADY YOUR NERVES TO GET RID OF A HANGOVER: NO

## 2024-07-30 ASSESSMENT — PAIN DESCRIPTION - PROGRESSION: CLINICAL_PROGRESSION: NOT CHANGED

## 2024-07-30 ASSESSMENT — PAIN - FUNCTIONAL ASSESSMENT: PAIN_FUNCTIONAL_ASSESSMENT: 0-10

## 2024-07-30 NOTE — ED PROVIDER NOTES
CC: Blood in Urine, Nausea, and Vomiting     HPI:  Patient is a 23-year-old female with no stated past medical history presented to the ED for hematuria,nausea, and vomiting.  Patient noted hematuria and nonbloody emesis began the day of ED presentation.  Patient notes that her urine is light brown.  Patient notes that she is on Nexplanon for birth control.  She notes that her periods are typically inconsistent.  Patient with that she had her last menstrual period in June.  Patient is also noting fevers and chills.  Patient has noted decreased p.o. intake secondary to vomiting.  Denied chest pain, difficulty breathing, headache, neck pain, abdominal pain, back pain, trauma, falls, dysuria, and abnormal balance.    Limitations to history: None  Independent historian(s): Patient  Records Reviewed: Recent available ED and inpatient notes reviewed in EMR.    PMHx/PSHx:  Per HPI.   - has a past medical history of Abdominal tenderness, unspecified site (01/24/2020), Acute vaginitis (05/23/2020), Encounter for gynecological examination (general) (routine) without abnormal findings (07/22/2019), Encounter for immunization (04/13/2018), Encounter for other general counseling and advice on contraception (01/15/2019), Encounter for pregnancy test, result negative (01/15/2019), Encounter for pregnancy test, result positive ([facility]) (03/06/2020), Encounter for screening for diabetes mellitus (07/09/2020), Encounter for screening for infections with a predominantly sexual mode of transmission (06/03/2020), Encounter for screening for infections with a predominantly sexual mode of transmission (09/12/2019), Encounter for surveillance of implantable subdermal contraceptive (11/13/2018), Encounter for surveillance of injectable contraceptive (07/22/2019), Other conditions influencing health status (04/13/2018), Other problems related to lifestyle (11/13/2018), Person with feared health complaint in whom no diagnosis is made  (01/24/2020), Personal history of other diseases of the digestive system (07/09/2020), Personal history of other diseases of the female genital tract (05/20/2020), Personal history of other diseases of the female genital tract, Personal history of other diseases of the female genital tract (01/24/2020), Personal history of other endocrine, nutritional and metabolic disease (03/05/2019), Personal history of other infectious and parasitic diseases (03/05/2019), Personal history of other infectious and parasitic diseases, Personal history of other mental and behavioral disorders, Personal history of other mental and behavioral disorders (03/05/2019), and Personal history of urinary (tract) infections (04/13/2018).  - has no past surgical history on file.    Medications:  Reviewed in EMR. See EMR for complete list of medications and doses.    Allergies:  Patient has no known allergies.    Social History:  - Tobacco:  has no history on file for tobacco use.   - Alcohol:  has no history on file for alcohol use.   - Illicit Drugs:  has no history on file for drug use.     ROS:  Per HPI.       ???????????????????????????????????????????????????????????????  Triage Vitals:  T 36.6 °C (97.9 °F)  HR 76  BP 98/56  RR 16  O2 97 %      Physical Exam  Vitals and nursing note reviewed.   Constitutional:       General: She is not in acute distress.  HENT:      Head: Normocephalic and atraumatic.      Nose: Nose normal.      Mouth/Throat:      Mouth: Mucous membranes are dry.   Eyes:      Conjunctiva/sclera: Conjunctivae normal.   Cardiovascular:      Rate and Rhythm: Normal rate and regular rhythm.      Pulses: Normal pulses.   Pulmonary:      Effort: Pulmonary effort is normal. No respiratory distress.      Breath sounds: Normal breath sounds.   Abdominal:      Palpations: Abdomen is soft.      Tenderness: There is no abdominal tenderness.   Skin:     General: Skin is warm.      Capillary Refill: Capillary refill takes 2 to 3  seconds.   Neurological:      General: No focal deficit present.      Mental Status: She is alert.         ???????????????????????????????????????????????????????????????  Labs:   Labs Reviewed   POCT PREGNANCY, URINE - Normal       Result Value    Preg Test, Ur Negative     URINALYSIS WITH REFLEX CULTURE AND MICROSCOPIC    Narrative:     The following orders were created for panel order Urinalysis with Reflex Culture and Microscopic.  Procedure                               Abnormality         Status                     ---------                               -----------         ------                     Urinalysis with Reflex C...[[id number]]                                                 Extra Urine Gray Tube[[id number]]                                                         Please view results for these tests on the individual orders.   URINALYSIS WITH REFLEX CULTURE AND MICROSCOPIC   EXTRA URINE GRAY TUBE        Imaging:   No orders to display      MDM:  Patient is a 23-year-old female with no stated past medical history presented to the ED for hematuria,nausea, and vomiting.  Patient presented hypotensive at 98/56 with remainder vitals WNL.  Physical exam findings significant for dry mucous membranes and delayed capillary refill.  Low clinical concern for septic shock, hemorrhagic shock, cardiogenic shock, neurogenic shock, traumatic process, vascular process, and metabolic process.  Patient is likely dehydrated secondary to viral syndrome.  Urine pregnancy testing was negative.  Patient administered Zofran and ibuprofen.  UA did not display findings are for UTI.  Declined COVID testing.  Patient tolerated p.o. and ambulated in the emergency department.  Repeat blood pressure was 100/63.  Patient prescribed Zofran.  Patient ordered primary care follow-up.  Patient received the phone number for the [facility].  Discussed ED findings, supportive management of viral syndrome, and strict return  precautions for any new or worsening symptoms including but limited to chest pain, difficulty breathing, and persistent vomiting.  Patient stated understanding and agreement with plan.  All questions were answered.  Patient discharged in stable condition.    ED Course:  Diagnoses as of 07/30/24 0742   Dehydration   Nausea and vomiting, unspecified vomiting type   Viral syndrome       Social Determinants Limiting Care:  None identified    Disposition:  Discharge    Keith Kebede MD   Emergency Medicine PGY-3  [facility] [place]    Comment: Please note this report has been produced using speech recognition software and may contain errors related to that system including errors in grammar, punctuation, and spelling as well as words and phrases that may be inappropriate.  If there are any questions or concerns please feel free to contact the dictating provider for clarification.    Procedures ? [employer] last updated 7/30/2024 4:32 AM        Keith Kebede MD  Resident  08/01/24 [id number]

## 2024-07-30 NOTE — DISCHARGE INSTRUCTIONS
You presents to the ED for vomiting likely secondary to viral syndrome.  He noted to have brown urine which is likely secondary dehydration.  Please drink at least 64 fluid ounces a day.  Your pregnancy testing was negative.  Your urine testing did not slight findings today for UTI.  Follow-up with your primary care physician within the next week.  Please return to the emergency department for any new or worsening symptoms.  The phone number for the primary care clinic at [facility] is [phone].

## 2024-07-31 ENCOUNTER — OFFICE VISIT (OUTPATIENT)
Dept: OBSTETRICS AND GYNECOLOGY | Facility: HOSPITAL | Age: 24
End: 2024-07-31
Payer: COMMERCIAL

## 2024-07-31 ENCOUNTER — APPOINTMENT (OUTPATIENT)
Dept: OBSTETRICS AND GYNECOLOGY | Facility: HOSPITAL | Age: 24
End: 2024-07-31
Payer: COMMERCIAL

## 2024-07-31 VITALS
HEIGHT: 67 IN | DIASTOLIC BLOOD PRESSURE: 64 MMHG | BODY MASS INDEX: 19.3 KG/M2 | SYSTOLIC BLOOD PRESSURE: 98 MMHG | WEIGHT: 123 LBS

## 2024-07-31 DIAGNOSIS — Z30.46 ENCOUNTER FOR NEXPLANON REMOVAL: Primary | ICD-10-CM

## 2024-07-31 LAB — BACTERIA UR CULT: NORMAL

## 2024-07-31 PROCEDURE — 11982 REMOVE DRUG IMPLANT DEVICE: CPT

## 2024-07-31 RX ORDER — ETONOGESTREL 68 MG/1
1 IMPLANT SUBCUTANEOUS ONCE
COMMUNITY

## 2024-07-31 ASSESSMENT — PATIENT HEALTH QUESTIONNAIRE - PHQ9
4. FEELING TIRED OR HAVING LITTLE ENERGY: SEVERAL DAYS
3. TROUBLE FALLING OR STAYING ASLEEP: SEVERAL DAYS
10. IF YOU CHECKED OFF ANY PROBLEMS, HOW DIFFICULT HAVE THESE PROBLEMS MADE IT FOR YOU TO DO YOUR WORK, TAKE CARE OF THINGS AT HOME, OR GET ALONG WITH OTHER PEOPLE: SOMEWHAT DIFFICULT
5. POOR APPETITE OR OVEREATING: SEVERAL DAYS
8. MOVING OR SPEAKING SO SLOWLY THAT OTHER PEOPLE COULD HAVE NOTICED. OR THE OPPOSITE, BEING SO FIGETY OR RESTLESS THAT YOU HAVE BEEN MOVING AROUND A LOT MORE THAN USUAL: MORE THAN HALF THE DAYS
SUM OF ALL RESPONSES TO PHQ QUESTIONS 1-9: 11
2. FEELING DOWN, DEPRESSED OR HOPELESS: MORE THAN HALF THE DAYS
1. LITTLE INTEREST OR PLEASURE IN DOING THINGS: MORE THAN HALF THE DAYS
7. TROUBLE CONCENTRATING ON THINGS, SUCH AS READING THE NEWSPAPER OR WATCHING TELEVISION: SEVERAL DAYS
6. FEELING BAD ABOUT YOURSELF - OR THAT YOU ARE A FAILURE OR HAVE LET YOURSELF OR YOUR FAMILY DOWN: SEVERAL DAYS
9. THOUGHTS THAT YOU WOULD BE BETTER OFF DEAD, OR OF HURTING YOURSELF: NOT AT ALL
SUM OF ALL RESPONSES TO PHQ9 QUESTIONS 1 & 2: 4

## 2024-07-31 ASSESSMENT — PAIN SCALES - GENERAL: PAINLEVEL: 0-NO PAIN

## 2024-07-31 NOTE — PROGRESS NOTES
"Tiburcio was seen today for procedure.  Diagnoses and all orders for this visit:  Encounter for Nexplanon removal (Primary)     Graciela Rhodes, KRISHAN-AVE    Subjective   Tiburcio  is a 23 y.o. female who presents for Nexplanon removal.    HPI    Nexplanon placed 10/2020    Menstrual History:  OB History    Para Term  AB Living   2         2   SAB IAB Ectopic Multiple Live Births                  # Outcome Date GA Lbr Molina/2nd Weight Sex Type Anes PTL Lv   2             1               No LMP recorded. Patient has had an implant.     Objective   BP 98/64   Ht 1.689 m (5' 6.5\")   Wt 55.8 kg (123 lb)   BMI 19.56 kg/m²     Procedure:  Subdermal Contraceptive Implant Removal  Date of Insertion:  10/2020  Date of Removal:  24    Information related to removal of the implant:   Reason(s) for removal: Desire pregnancy  Was implant palpable before removal? Yes    Procedure:    Implant identified. Left upper arm prepped with Betadinex3. 1% lidocaine injected at planned incision site. A vertical incision 2 mm was performed with a scalpel at the distal end of implant. The implant was removed using a hemostat. The implant was inspected and found to be intact and complete.  Steri strips and a pressure dressing were applied to the site. After removal instructions were given and verbally reviewed with the patient who acknowledged her understanding.    Difficulties with the implant removal procedure?  no    Plans for contraception:  no method, desires pregnancy    Other follow-up needed:  none   "

## 2024-11-13 ENCOUNTER — APPOINTMENT (OUTPATIENT)
Dept: OBSTETRICS AND GYNECOLOGY | Facility: CLINIC | Age: 24
End: 2024-11-13
Payer: COMMERCIAL

## 2024-11-30 ENCOUNTER — HOSPITAL ENCOUNTER (EMERGENCY)
Facility: HOSPITAL | Age: 24
Discharge: HOME | End: 2024-12-01
Attending: EMERGENCY MEDICINE
Payer: COMMERCIAL

## 2024-11-30 DIAGNOSIS — R11.2 NAUSEA AND VOMITING, UNSPECIFIED VOMITING TYPE: ICD-10-CM

## 2024-11-30 DIAGNOSIS — N89.8 VAGINAL DISCHARGE: ICD-10-CM

## 2024-11-30 DIAGNOSIS — R10.84 GENERALIZED ABDOMINAL PAIN: Primary | ICD-10-CM

## 2024-11-30 LAB — PREGNANCY TEST URINE, POC: NEGATIVE

## 2024-11-30 PROCEDURE — 85025 COMPLETE CBC W/AUTO DIFF WBC: CPT

## 2024-11-30 PROCEDURE — 87389 HIV-1 AG W/HIV-1&-2 AB AG IA: CPT

## 2024-11-30 PROCEDURE — 86780 TREPONEMA PALLIDUM: CPT

## 2024-11-30 PROCEDURE — 36415 COLL VENOUS BLD VENIPUNCTURE: CPT

## 2024-11-30 PROCEDURE — 87081 CULTURE SCREEN ONLY: CPT

## 2024-11-30 PROCEDURE — 84075 ASSAY ALKALINE PHOSPHATASE: CPT

## 2024-11-30 PROCEDURE — 87210 SMEAR WET MOUNT SALINE/INK: CPT

## 2024-11-30 PROCEDURE — 81001 URINALYSIS AUTO W/SCOPE: CPT

## 2024-11-30 PROCEDURE — 2500000005 HC RX 250 GENERAL PHARMACY W/O HCPCS: Mod: SE

## 2024-11-30 PROCEDURE — 87491 CHLMYD TRACH DNA AMP PROBE: CPT

## 2024-11-30 PROCEDURE — 99284 EMERGENCY DEPT VISIT MOD MDM: CPT

## 2024-11-30 PROCEDURE — 83690 ASSAY OF LIPASE: CPT

## 2024-11-30 PROCEDURE — 80053 COMPREHEN METABOLIC PANEL: CPT

## 2024-11-30 PROCEDURE — 81025 URINE PREGNANCY TEST: CPT

## 2024-11-30 PROCEDURE — 99284 EMERGENCY DEPT VISIT MOD MDM: CPT | Performed by: EMERGENCY MEDICINE

## 2024-11-30 RX ORDER — CEFTRIAXONE 500 MG/1
500 INJECTION, POWDER, FOR SOLUTION INTRAMUSCULAR; INTRAVENOUS ONCE
Status: COMPLETED | OUTPATIENT
Start: 2024-11-30 | End: 2024-12-01

## 2024-11-30 RX ORDER — AZITHROMYCIN 500 MG/1
1000 TABLET, FILM COATED ORAL ONCE
Status: COMPLETED | OUTPATIENT
Start: 2024-11-30 | End: 2024-12-01

## 2024-11-30 RX ORDER — METRONIDAZOLE 500 MG/1
2000 TABLET ORAL ONCE
Status: COMPLETED | OUTPATIENT
Start: 2024-11-30 | End: 2024-12-01

## 2024-11-30 RX ORDER — ONDANSETRON 4 MG/1
4 TABLET, ORALLY DISINTEGRATING ORAL ONCE
Status: COMPLETED | OUTPATIENT
Start: 2024-11-30 | End: 2024-11-30

## 2024-11-30 RX ADMIN — ONDANSETRON 4 MG: 4 TABLET, ORALLY DISINTEGRATING ORAL at 23:19

## 2024-11-30 ASSESSMENT — PAIN DESCRIPTION - PAIN TYPE: TYPE: ACUTE PAIN

## 2024-11-30 ASSESSMENT — PAIN SCALES - GENERAL: PAINLEVEL_OUTOF10: 6

## 2024-11-30 ASSESSMENT — PAIN DESCRIPTION - LOCATION: LOCATION: ABDOMEN

## 2024-11-30 ASSESSMENT — LIFESTYLE VARIABLES
HAVE PEOPLE ANNOYED YOU BY CRITICIZING YOUR DRINKING: NO
TOTAL SCORE: 0
EVER FELT BAD OR GUILTY ABOUT YOUR DRINKING: NO
EVER HAD A DRINK FIRST THING IN THE MORNING TO STEADY YOUR NERVES TO GET RID OF A HANGOVER: NO
HAVE YOU EVER FELT YOU SHOULD CUT DOWN ON YOUR DRINKING: NO

## 2024-11-30 ASSESSMENT — PAIN - FUNCTIONAL ASSESSMENT: PAIN_FUNCTIONAL_ASSESSMENT: 0-10

## 2024-11-30 ASSESSMENT — PAIN DESCRIPTION - ORIENTATION: ORIENTATION: LOWER

## 2024-11-30 ASSESSMENT — PAIN DESCRIPTION - DESCRIPTORS: DESCRIPTORS: CRAMPING

## 2024-12-01 ENCOUNTER — APPOINTMENT (OUTPATIENT)
Dept: RADIOLOGY | Facility: HOSPITAL | Age: 24
End: 2024-12-01
Payer: COMMERCIAL

## 2024-12-01 VITALS
BODY MASS INDEX: 19.62 KG/M2 | TEMPERATURE: 97.3 F | SYSTOLIC BLOOD PRESSURE: 93 MMHG | DIASTOLIC BLOOD PRESSURE: 65 MMHG | HEIGHT: 67 IN | OXYGEN SATURATION: 99 % | HEART RATE: 89 BPM | WEIGHT: 125 LBS | RESPIRATION RATE: 16 BRPM

## 2024-12-01 LAB
ALBUMIN SERPL BCP-MCNC: 4.2 G/DL (ref 3.4–5)
ALP SERPL-CCNC: 61 U/L (ref 33–110)
ALT SERPL W P-5'-P-CCNC: 10 U/L (ref 7–45)
ANION GAP SERPL CALC-SCNC: 13 MMOL/L (ref 10–20)
APPEARANCE UR: CLEAR
AST SERPL W P-5'-P-CCNC: 14 U/L (ref 9–39)
BASOPHILS # BLD AUTO: 0.02 X10*3/UL (ref 0–0.1)
BASOPHILS NFR BLD AUTO: 0.2 %
BILIRUB SERPL-MCNC: 0.6 MG/DL (ref 0–1.2)
BILIRUB UR STRIP.AUTO-MCNC: NEGATIVE MG/DL
BUN SERPL-MCNC: 9 MG/DL (ref 6–23)
C TRACH RRNA SPEC QL NAA+PROBE: NEGATIVE
CALCIUM SERPL-MCNC: 9 MG/DL (ref 8.6–10.6)
CHLORIDE SERPL-SCNC: 104 MMOL/L (ref 98–107)
CLUE CELLS SPEC QL WET PREP: PRESENT
CO2 SERPL-SCNC: 24 MMOL/L (ref 21–32)
COLOR UR: YELLOW
CREAT SERPL-MCNC: 0.63 MG/DL (ref 0.5–1.05)
EGFRCR SERPLBLD CKD-EPI 2021: >90 ML/MIN/1.73M*2
EOSINOPHIL # BLD AUTO: 0.07 X10*3/UL (ref 0–0.7)
EOSINOPHIL NFR BLD AUTO: 0.7 %
ERYTHROCYTE [DISTWIDTH] IN BLOOD BY AUTOMATED COUNT: 12.2 % (ref 11.5–14.5)
GLUCOSE SERPL-MCNC: 117 MG/DL (ref 74–99)
GLUCOSE UR STRIP.AUTO-MCNC: NORMAL MG/DL
HCT VFR BLD AUTO: 39 % (ref 36–46)
HGB BLD-MCNC: 13.9 G/DL (ref 12–16)
HIV 1+2 AB+HIV1 P24 AG SERPL QL IA: NONREACTIVE
HOLD SPECIMEN: NORMAL
IMM GRANULOCYTES # BLD AUTO: 0.03 X10*3/UL (ref 0–0.7)
IMM GRANULOCYTES NFR BLD AUTO: 0.3 % (ref 0–0.9)
KETONES UR STRIP.AUTO-MCNC: NEGATIVE MG/DL
LEUKOCYTE ESTERASE UR QL STRIP.AUTO: NEGATIVE
LIPASE SERPL-CCNC: 23 U/L (ref 9–82)
LYMPHOCYTES # BLD AUTO: 1.14 X10*3/UL (ref 1.2–4.8)
LYMPHOCYTES NFR BLD AUTO: 11.9 %
MCH RBC QN AUTO: 30.1 PG (ref 26–34)
MCHC RBC AUTO-ENTMCNC: 35.6 G/DL (ref 32–36)
MCV RBC AUTO: 84 FL (ref 80–100)
MONOCYTES # BLD AUTO: 0.52 X10*3/UL (ref 0.1–1)
MONOCYTES NFR BLD AUTO: 5.4 %
MUCOUS THREADS #/AREA URNS AUTO: NORMAL /LPF
N GONORRHOEA DNA SPEC QL PROBE+SIG AMP: NEGATIVE
NEUTROPHILS # BLD AUTO: 7.78 X10*3/UL (ref 1.2–7.7)
NEUTROPHILS NFR BLD AUTO: 81.5 %
NITRITE UR QL STRIP.AUTO: NEGATIVE
NRBC BLD-RTO: 0 /100 WBCS (ref 0–0)
PH UR STRIP.AUTO: 5.5 [PH]
PLATELET # BLD AUTO: 260 X10*3/UL (ref 150–450)
POTASSIUM SERPL-SCNC: 4.1 MMOL/L (ref 3.5–5.3)
PROT SERPL-MCNC: 6.9 G/DL (ref 6.4–8.2)
PROT UR STRIP.AUTO-MCNC: ABNORMAL MG/DL
RBC # BLD AUTO: 4.62 X10*6/UL (ref 4–5.2)
RBC # UR STRIP.AUTO: ABNORMAL /UL
RBC #/AREA URNS AUTO: NORMAL /HPF
SODIUM SERPL-SCNC: 137 MMOL/L (ref 136–145)
SP GR UR STRIP.AUTO: 1.03
SQUAMOUS #/AREA URNS AUTO: NORMAL /HPF
T VAGINALIS SPEC QL WET PREP: ABNORMAL
TREPONEMA PALLIDUM IGG+IGM AB [PRESENCE] IN SERUM OR PLASMA BY IMMUNOASSAY: NONREACTIVE
UROBILINOGEN UR STRIP.AUTO-MCNC: NORMAL MG/DL
WBC # BLD AUTO: 9.6 X10*3/UL (ref 4.4–11.3)
WBC #/AREA URNS AUTO: NORMAL /HPF
WBC VAG QL WET PREP: ABNORMAL
YEAST VAG QL WET PREP: ABNORMAL

## 2024-12-01 PROCEDURE — 2500000001 HC RX 250 WO HCPCS SELF ADMINISTERED DRUGS (ALT 637 FOR MEDICARE OP): Mod: SE

## 2024-12-01 PROCEDURE — 76830 TRANSVAGINAL US NON-OB: CPT

## 2024-12-01 PROCEDURE — 96372 THER/PROPH/DIAG INJ SC/IM: CPT

## 2024-12-01 PROCEDURE — 2500000004 HC RX 250 GENERAL PHARMACY W/ HCPCS (ALT 636 FOR OP/ED): Mod: SE

## 2024-12-01 RX ORDER — ONDANSETRON 4 MG/1
4 TABLET, FILM COATED ORAL EVERY 6 HOURS
Qty: 12 TABLET | Refills: 0 | Status: SHIPPED | OUTPATIENT
Start: 2024-12-01 | End: 2024-12-04

## 2024-12-01 RX ADMIN — CEFTRIAXONE SODIUM 500 MG: 500 INJECTION, POWDER, FOR SOLUTION INTRAMUSCULAR; INTRAVENOUS at 01:48

## 2024-12-01 RX ADMIN — AZITHROMYCIN DIHYDRATE 1000 MG: 500 TABLET ORAL at 01:48

## 2024-12-01 RX ADMIN — METRONIDAZOLE 2000 MG: 500 TABLET ORAL at 01:48

## 2024-12-01 ASSESSMENT — PAIN SCALES - GENERAL: PAINLEVEL_OUTOF10: 0 - NO PAIN

## 2024-12-01 NOTE — ED TRIAGE NOTES
Patient endorsing NV, dizziness, and abdominal cramping since she woke up at around 1300; patient states there could be a chance of pregnancy; denies any chance of STIs; patient does endorse urinary urgency and retention for the past couple of days; states she does have yellow discharge

## 2024-12-01 NOTE — ED PROVIDER NOTES
History of Present Illness     History provided by: Patient  Limitations to History: None  External Records Reviewed with Brief Summary: Discharge Summary from 2024 which showed patient was evaluated for STD exposure and Outpatient progress note from 2024 which showed patient had an appointment for Nexplanon removal.     HPI:  Tiburcio Roman is a 24 y.o. female presenting to the ED for evaluation of 1 week of pelvic pain and yellow vaginal discharge. Patient states that she began experiencing abdominal pain, nausea, vomiting, earlier this afternoon. She reports subjective fever and chills. Patient states that she is sexually active and actively trying for a pregnancy. She would like pregnancy testing while here today as well. Patient denies any chest pain or shortness of breath. She denies any dysuria or hematuria.      Physical Exam   Triage vitals:  T 36.3 °C (97.3 °F)  HR (!) 103  BP 96/65  RR 16  O2 100 % None (Room air)    General: Awake, alert, in no acute distress  Eyes: Gaze conjugate.  No scleral icterus or injection  HENT: Normo-cephalic, atraumatic. No stridor  CV: Tachycardic rate, regular rhythm. Radial pulses 2+ bilaterally  Resp: Breathing non-labored, speaking in full sentences.  Clear to auscultation bilaterally  GI: Soft, non-distended, non-tender. No rebound or guarding.  : Exam performed with female chaperone present. Patient had copious white discharge present in the vaginal vault which was swabbed. Patient had cervical motion tenderness without adnexal tenderness. No bleeding appreciated. Patient tolerated exam and swabs well without difficulty.   MSK/Extremities: No gross bony deformities. Moving all extremities  Skin: Warm. Appropriate color  Neuro: Alert. Oriented. Face symmetric. Speech is fluent.  Gross strength and sensation intact in b/l UE and LEs  Psych: Appropriate mood and affect    Medical Decision Making & ED Course   Medical Decision Makin y.o. female  presenting to the ED for new onset abdominal pain nausea vomiting following one week of pelvic pain/pressure with a yellow vaginal discharge. Differential at this time includes intrauterine vs ectopic pregnancy, TOA, viral gastroenteritis, STIs vs PID.     Patient was given 4mg Zofran ODT for symptomatic relief. Laboratory studies obtained which reveals no evidence of infection on urinalysis. Lipase within normal. CMP unremarkable. CBC reveals no leukocytosis or significant anemia. POCT urine pregnancy test negative. Wet prep positive for clue cells/bacterial vaginosis. Syphilis screen pending at the time of this note.     Patient received transvaginal ultrasound and tolerated this well. She reports resolution of her symptoms and she is ready to return home at this time. Discussed that we are still waiting on the results of her ultrasound and then can discharge home following that read. Patient is agreeable to waiting for formal ultrasound results.     Patient's ultrasound results were completed and revealed multiple right ovarian follicles with in incompletely characterized cystic structure in adjacent to the right adnexa most  likely represent a hemorrhagic cyst. They recommend repeat imaging in 6-12 weeks for resolution. Patient updated on these results and she is comfortable following up outpatient. She was already planning to follow up with them regarding her desire to become pregnant/family planning. She was given a prescription of Zofran for symptomatic relief and given strict return precautions. Patient verbalized agreement and understanding and was discharged home in stable condition.     ----    Differential diagnoses considered include but are not limited to: See MDM     Social Determinants of Health which Significantly Impact Care: None identified     EKG Independent Interpretation: EKG not obtained    Independent Result Review and Interpretation: Relevant laboratory and radiographic results were  reviewed and independently interpreted by myself.  As necessary, they are commented on in the ED Course.    Chronic conditions affecting the patient's care: As documented above in MDM    The patient was discussed with the following consultants/services: None    Care Considerations: As documented above in [facility]    ED Course:  Diagnoses as of 12/01/24 0212   Generalized abdominal pain   Nausea and vomiting, unspecified vomiting type   Vaginal discharge     Disposition   As a result of the work-up, the patient was discharged home.  she was informed of her diagnosis and instructed to come back with any concerns or worsening of condition.  she and was agreeable to the plan as discussed above.  she was given the opportunity to ask questions.  All of the patient's questions were answered.    Procedures   Procedures    Patient seen and discussed with ED attending physician.    Marline Howell DO  Emergency Medicine     Marline Howell DO  Resident  12/01/24 0249

## 2024-12-01 NOTE — DISCHARGE INSTRUCTIONS
You were seen in the emergency department for nausea, vomiting, abdominal pain and vaginal discharge. We did a pelvic exam as well as swabs for STIs and lab testing for STIs. We treated you prophylactically with one time doses of Flagyl, Rocephin, and Zithromax. You should return to the emergency department if you begin vomiting blood, have increased abnormal vaginal bleeding, or for a fever which does not resolve with Tylenol use at home. Feel free to return if your symptoms worsen for any reason. Follow up with OB/GYN regarding your questions surrounding ovulation and your fertility and desired plan for a child.

## 2024-12-07 LAB — N GONORRHOEA SPEC QL CULT: NORMAL

## 2025-01-02 ENCOUNTER — APPOINTMENT (OUTPATIENT)
Dept: OBSTETRICS AND GYNECOLOGY | Facility: CLINIC | Age: 25
End: 2025-01-02
Payer: COMMERCIAL

## 2025-01-16 ENCOUNTER — OFFICE VISIT (OUTPATIENT)
Dept: OBSTETRICS AND GYNECOLOGY | Facility: CLINIC | Age: 25
End: 2025-01-16
Payer: COMMERCIAL

## 2025-01-16 VITALS
WEIGHT: 138 LBS | DIASTOLIC BLOOD PRESSURE: 60 MMHG | SYSTOLIC BLOOD PRESSURE: 100 MMHG | HEIGHT: 67 IN | BODY MASS INDEX: 21.66 KG/M2

## 2025-01-16 DIAGNOSIS — N93.9 ABNORMAL UTERINE BLEEDING (AUB): ICD-10-CM

## 2025-01-16 DIAGNOSIS — Z31.69 ENCOUNTER FOR PRECONCEPTION CONSULTATION: ICD-10-CM

## 2025-01-16 DIAGNOSIS — N83.209 CYST OF OVARY, UNSPECIFIED LATERALITY: Primary | ICD-10-CM

## 2025-01-16 LAB — PREGNANCY TEST URINE, POC: NEGATIVE

## 2025-01-16 PROCEDURE — 99214 OFFICE O/P EST MOD 30 MIN: CPT | Performed by: OBSTETRICS & GYNECOLOGY

## 2025-01-16 PROCEDURE — 81025 URINE PREGNANCY TEST: CPT | Performed by: OBSTETRICS & GYNECOLOGY

## 2025-01-16 PROCEDURE — 3008F BODY MASS INDEX DOCD: CPT | Performed by: OBSTETRICS & GYNECOLOGY

## 2025-01-16 ASSESSMENT — ENCOUNTER SYMPTOMS
OCCASIONAL FEELINGS OF UNSTEADINESS: 0
LOSS OF SENSATION IN FEET: 0
DEPRESSION: 0
BACK PAIN: 1

## 2025-01-16 ASSESSMENT — PAIN SCALES - GENERAL: PAINLEVEL_OUTOF10: 5

## 2025-01-16 NOTE — PATIENT INSTRUCTIONS
Thanks for coming in today for follow up.     A prescription for a prenatal vitamin has been sent to your pharmacy.     Arrange to have a follow up ultrasound performed to recheck your ovarian cyst.     Return to the office to have an annual gyn exam.
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
## 2025-01-27 ENCOUNTER — APPOINTMENT (OUTPATIENT)
Dept: OBSTETRICS AND GYNECOLOGY | Facility: CLINIC | Age: 25
End: 2025-01-27
Payer: COMMERCIAL

## 2025-01-27 ENCOUNTER — HOSPITAL ENCOUNTER (OUTPATIENT)
Dept: RADIOLOGY | Facility: HOSPITAL | Age: 25
Discharge: HOME | End: 2025-01-27
Payer: COMMERCIAL

## 2025-01-27 DIAGNOSIS — N83.209 CYST OF OVARY, UNSPECIFIED LATERALITY: ICD-10-CM

## 2025-01-27 PROCEDURE — 76830 TRANSVAGINAL US NON-OB: CPT | Performed by: RADIOLOGY

## 2025-01-27 PROCEDURE — 76856 US EXAM PELVIC COMPLETE: CPT | Performed by: RADIOLOGY

## 2025-01-27 PROCEDURE — 76856 US EXAM PELVIC COMPLETE: CPT

## 2025-02-06 ENCOUNTER — TELEPHONE (OUTPATIENT)
Dept: OBSTETRICS AND GYNECOLOGY | Facility: CLINIC | Age: 25
End: 2025-02-06
Payer: COMMERCIAL

## 2025-02-06 NOTE — TELEPHONE ENCOUNTER
----- Message from Cintia Grider sent at 1/31/2025  3:49 PM EST -----  The previously seen right ovarian cyst is now gone/resolved and there is a new small left ovarian cyst. RTC for APE and F/U with Dr. Orellana is severe/chronic abdominal pain persist.

## 2025-02-11 ENCOUNTER — TELEPHONE (OUTPATIENT)
Dept: OBSTETRICS AND GYNECOLOGY | Facility: CLINIC | Age: 25
End: 2025-02-11
Payer: COMMERCIAL

## 2025-02-11 NOTE — TELEPHONE ENCOUNTER
----- Message from Cintia Grider sent at 2/7/2025  1:02 PM EST -----  She doesn't need a referral to follow up with Reyna for sever pain, she just has to schedule with her.  ----- Message -----  From: Ruth Chaves RN  Sent: 2/6/2025   3:50 PM EST  To: Cintia Grider MD    Did you want to put a referral in so if she does call back she can schedule?  ----- Message -----  From: Cintia Grider MD  Sent: 1/31/2025   3:49 PM EST  To: Ruth Chaves RN    The previously seen right ovarian cyst is now gone/resolved and there is a new small left ovarian cyst. RTC for APE and F/U with Dr. Orellana is severe/chronic abdominal pain persist.

## 2025-03-21 ENCOUNTER — HOSPITAL ENCOUNTER (EMERGENCY)
Facility: HOSPITAL | Age: 25
Discharge: HOME | End: 2025-03-21
Attending: STUDENT IN AN ORGANIZED HEALTH CARE EDUCATION/TRAINING PROGRAM
Payer: COMMERCIAL

## 2025-03-21 VITALS
OXYGEN SATURATION: 100 % | HEART RATE: 85 BPM | BODY MASS INDEX: 21.69 KG/M2 | WEIGHT: 135 LBS | DIASTOLIC BLOOD PRESSURE: 67 MMHG | HEIGHT: 66 IN | RESPIRATION RATE: 16 BRPM | TEMPERATURE: 98.6 F | SYSTOLIC BLOOD PRESSURE: 99 MMHG

## 2025-03-21 DIAGNOSIS — R11.0 NAUSEA: Primary | ICD-10-CM

## 2025-03-21 LAB
ANION GAP SERPL CALC-SCNC: 10 MMOL/L (ref 10–20)
APPEARANCE UR: CLEAR
BASOPHILS # BLD AUTO: 0.05 X10*3/UL (ref 0–0.1)
BASOPHILS NFR BLD AUTO: 1 %
BILIRUB UR STRIP.AUTO-MCNC: NEGATIVE MG/DL
BUN SERPL-MCNC: 9 MG/DL (ref 6–23)
CALCIUM SERPL-MCNC: 9 MG/DL (ref 8.6–10.6)
CHLORIDE SERPL-SCNC: 104 MMOL/L (ref 98–107)
CLUE CELLS SPEC QL WET PREP: NORMAL
CO2 SERPL-SCNC: 28 MMOL/L (ref 21–32)
COLOR UR: YELLOW
CREAT SERPL-MCNC: 0.62 MG/DL (ref 0.5–1.05)
EGFRCR SERPLBLD CKD-EPI 2021: >90 ML/MIN/1.73M*2
EOSINOPHIL # BLD AUTO: 0.24 X10*3/UL (ref 0–0.7)
EOSINOPHIL NFR BLD AUTO: 5 %
ERYTHROCYTE [DISTWIDTH] IN BLOOD BY AUTOMATED COUNT: 12.4 % (ref 11.5–14.5)
GLUCOSE SERPL-MCNC: 83 MG/DL (ref 74–99)
GLUCOSE UR STRIP.AUTO-MCNC: NORMAL MG/DL
HCT VFR BLD AUTO: 36.8 % (ref 36–46)
HCV AB SER QL: NONREACTIVE
HGB BLD-MCNC: 13.1 G/DL (ref 12–16)
HIV 1+2 AB+HIV1 P24 AG SERPL QL IA: NONREACTIVE
IMM GRANULOCYTES # BLD AUTO: 0.01 X10*3/UL (ref 0–0.7)
IMM GRANULOCYTES NFR BLD AUTO: 0.2 % (ref 0–0.9)
KETONES UR STRIP.AUTO-MCNC: ABNORMAL MG/DL
LEUKOCYTE ESTERASE UR QL STRIP.AUTO: NEGATIVE
LYMPHOCYTES # BLD AUTO: 2.78 X10*3/UL (ref 1.2–4.8)
LYMPHOCYTES NFR BLD AUTO: 57.8 %
MCH RBC QN AUTO: 30.2 PG (ref 26–34)
MCHC RBC AUTO-ENTMCNC: 35.6 G/DL (ref 32–36)
MCV RBC AUTO: 85 FL (ref 80–100)
MONOCYTES # BLD AUTO: 0.28 X10*3/UL (ref 0.1–1)
MONOCYTES NFR BLD AUTO: 5.8 %
MUCOUS THREADS #/AREA URNS AUTO: NORMAL /LPF
NEUTROPHILS # BLD AUTO: 1.45 X10*3/UL (ref 1.2–7.7)
NEUTROPHILS NFR BLD AUTO: 30.2 %
NITRITE UR QL STRIP.AUTO: NEGATIVE
NRBC BLD-RTO: 0 /100 WBCS (ref 0–0)
PH UR STRIP.AUTO: 6.5 [PH]
PLATELET # BLD AUTO: 255 X10*3/UL (ref 150–450)
POTASSIUM SERPL-SCNC: 3.7 MMOL/L (ref 3.5–5.3)
PREGNANCY TEST URINE, POC: NEGATIVE
PROT UR STRIP.AUTO-MCNC: ABNORMAL MG/DL
RBC # BLD AUTO: 4.34 X10*6/UL (ref 4–5.2)
RBC # UR STRIP.AUTO: ABNORMAL MG/DL
RBC #/AREA URNS AUTO: NORMAL /HPF
SODIUM SERPL-SCNC: 138 MMOL/L (ref 136–145)
SP GR UR STRIP.AUTO: 1.03
SQUAMOUS #/AREA URNS AUTO: NORMAL /HPF
T VAGINALIS SPEC QL WET PREP: NORMAL
TREPONEMA PALLIDUM IGG+IGM AB [PRESENCE] IN SERUM OR PLASMA BY IMMUNOASSAY: NONREACTIVE
TRICHOMONAS REFLEX COMMENT: NORMAL
TSH SERPL-ACNC: 0.61 MIU/L (ref 0.44–3.98)
UROBILINOGEN UR STRIP.AUTO-MCNC: ABNORMAL MG/DL
WBC # BLD AUTO: 4.8 X10*3/UL (ref 4.4–11.3)
WBC #/AREA URNS AUTO: NORMAL /HPF
WBC VAG QL WET PREP: NORMAL
YEAST VAG QL WET PREP: NORMAL

## 2025-03-21 PROCEDURE — 99284 EMERGENCY DEPT VISIT MOD MDM: CPT

## 2025-03-21 PROCEDURE — 36415 COLL VENOUS BLD VENIPUNCTURE: CPT

## 2025-03-21 PROCEDURE — 99283 EMERGENCY DEPT VISIT LOW MDM: CPT | Performed by: STUDENT IN AN ORGANIZED HEALTH CARE EDUCATION/TRAINING PROGRAM

## 2025-03-21 PROCEDURE — 87389 HIV-1 AG W/HIV-1&-2 AB AG IA: CPT

## 2025-03-21 PROCEDURE — 80048 BASIC METABOLIC PNL TOTAL CA: CPT

## 2025-03-21 PROCEDURE — 81001 URINALYSIS AUTO W/SCOPE: CPT

## 2025-03-21 PROCEDURE — 84443 ASSAY THYROID STIM HORMONE: CPT

## 2025-03-21 PROCEDURE — 87491 CHLMYD TRACH DNA AMP PROBE: CPT

## 2025-03-21 PROCEDURE — 87210 SMEAR WET MOUNT SALINE/INK: CPT | Mod: 59

## 2025-03-21 PROCEDURE — 85025 COMPLETE CBC W/AUTO DIFF WBC: CPT

## 2025-03-21 PROCEDURE — 87661 TRICHOMONAS VAGINALIS AMPLIF: CPT

## 2025-03-21 PROCEDURE — 86803 HEPATITIS C AB TEST: CPT

## 2025-03-21 PROCEDURE — 81025 URINE PREGNANCY TEST: CPT

## 2025-03-21 PROCEDURE — 86780 TREPONEMA PALLIDUM: CPT

## 2025-03-21 ASSESSMENT — PAIN DESCRIPTION - ORIENTATION: ORIENTATION: LOWER

## 2025-03-21 ASSESSMENT — PAIN SCALES - GENERAL: PAINLEVEL_OUTOF10: 7

## 2025-03-21 ASSESSMENT — PAIN DESCRIPTION - LOCATION: LOCATION: BACK

## 2025-03-21 ASSESSMENT — PAIN DESCRIPTION - PAIN TYPE: TYPE: ACUTE PAIN

## 2025-03-21 ASSESSMENT — PAIN - FUNCTIONAL ASSESSMENT: PAIN_FUNCTIONAL_ASSESSMENT: 0-10

## 2025-03-21 NOTE — ED PROVIDER NOTES
"History of Present Illness     History provided by: Patient  Limitations to History: None    HPI:  Tiburcio Roman is a 24 y.o. female presenting with nausea and low back pain.  Patient endorses vaginal spotting yesterday.  LMP 2025.    Physical Exam   Triage vitals:  T 37 °C (98.6 °F)  HR 85  BP 99/67  RR 16  O2 100 % None (Room air)    {ED Physical Exam:08308}    Medical Decision Making & ED Course   Medical Decision Makin y.o. female presenting with *** . Triage vital signs reviewed and *** patient is hemodynamically stable at this time.     {Scoring Tools Utilized:91099}    ED Course:       Social Determinants of Health which Significantly Impact Care: {Social Determinants of Health which Significantly Impact Care:51368::\"None identified\"} {The following actions were taken to address these social determinants:37315}    EKG Independent Interpretation: {EKG Independent Interpretation:49276}    Independent Result Review and Interpretation: Relevant laboratory and radiographic results were reviewed and independently interpreted by myself.  As necessary, they are commented on in the ED Course.    Care Considerations: As documented above in MDM      Disposition   {ED Disposition:32443}    Procedures   Procedures    Patient seen and discussed with ED attending physician.    Ethel Mcknight, DO  Emergency Medicine  "

## 2025-03-21 NOTE — ED TRIAGE NOTES
"Patient complains of nausea and lower back pain for \"months.\" Patient states she could possibly be pregnant (LMP 02/24/25). Patient also complains of vaginal spotting yesterday.   "

## 2025-03-21 NOTE — ED PROVIDER NOTES
History of Present Illness       History provided by: Patient  Limitations to History: None    HPI:  HPI   Patient is a 24-year-old  female with no significant past medical history that presents to the ED for nausea.  She is not currently nauseous but admits to periodic nausea without vomiting over the past 2 months.  Her and her  are trying for pregnancy and she suspects that might be the cause.  Last menstrual period was 25.  However, she does admit to vaginal spotting that began earlier today.  Denies abnormal vaginal discharge.  She does admit to urinary frequency without burning or hesitation.  She also reports bilateral low back pain that she describes as aching.  She states that this has been going on as long as she can remember.  This is patient's baseline and she would not like any medication for pain.  She is not concerned for sexually transmitted infection and would not like testing today.      Physical Exam   Physical Exam     VS: As documented in the triage note and EMR flowsheet from this visit were reviewed.    Appearance: Alert, oriented, cooperative, in no acute distress. Well nourished & well hydrated.    Skin: Atraumatic. Warm, intact and dry. No lesions, rash, or petechiae.    Eyes: PERRLA, EOMs intact. No pain with EOMs. No nystagmus. Bilateral conjunctivae pink without injection or exudates.     ENT: Hearing grossly intact. No drooling, dysphagia or trismus. Voice non-muffled.    Neck: Supple, without meningismus. No midline or paraspinal tenderness.    Pulmonary: Clear bilaterally with good chest wall excursion. No rales, rhonchi or wheezing. No accessory muscle use or stridor. Nonlabored breathing, no supplemental oxygen.     Cardiac: Normal S1, S2 without murmur.     Abdomen: Soft, nontender to palpation. Negative Dueñas's sign. No point tenderness at McBurney's point, negative Rovsing. No palpable organomegaly. No rebound or guarding. No CVA  tenderness.    Musculoskeletal: Spontaneously moving all extremities without limitation. No midline tenderness.  Mild tenderness to palpation bilateral lower back paraspinal.  Extremities warm and well-perfused, capillary refill less than 2 seconds.     Neurological:  Cranial nerves II through XII are grossly intact, no focal findings identified. Ambulating without assistance with steady gait, non-ataxic.    Psychiatric: Appropriate mood and affect. Kempt appearance. Does not appear internally stimulated.             Medical Decision Making & ED Course     ED Course & MDM       Medical Decision Making:  Medical Decision Making     ----  Patient is a 24-year-old  female with no significant past medical history that presents to the ED for nausea.  History obtained from patient. History and physical exam are as documented above. Vitals stable. Patient was seen and discussed with Dr. Palafox. Differential diagnoses considered include but are not limited to: Pregnancy, ectopic, urinary tract infection, sexually transmitted infection.    Patient is resting comfortably in ED exam chair and does not appear to be in any acute distress.  She has been intermittently nauseous over the past few months, no current nausea.  She also admits to urinary frequency without burning or hesitation.  She is concerned for pregnancy or urinary tract infection.  Urine pregnancy test is negative.  Urinalysis did not reveal any sign of acute infection.  Low suspicion for acute infectious abdominal process such as appendicitis, pancreatitis, cholecystitis as patient is not experience any abdominal pain, nontender throughout on exam, nonfebrile.  No acute blood or electrolyte abnormalities identified on CBC or BMP.  Wet prep was negative for trichomonas, clue cells, yeast.  TSH was normal at 0.61.  Gonorrhea, chlamydia, syphilis, hepatitis C screening still pending.  Patient is requesting discharge and agreeable to follow-up with results via  "[facility].  She is informed that she will be contacted with any abnormal results requiring further treatment.  Referrals were placed for primary care and gynecology for her to establish care.  Given strict return precautions to return to the ED for any new or worsening symptoms.  Patient is in agreement with this plan and discharged in stable condition.             Visit Vitals  BP 99/67 (BP Location: Right arm, Patient Position: Sitting)   Pulse 85   Temp 37 °C (98.6 °F)   Resp 16   Ht 1.676 m (5' 6\")   Wt 61.2 kg (135 lb)   LMP 02/24/2025 (Exact Date)   SpO2 100%   BMI 21.79 kg/m²   OB Status Unknown   Smoking Status Every Day   BSA 1.69 m²        Labs Reviewed   URINALYSIS WITH REFLEX CULTURE AND MICROSCOPIC    Narrative:     The following orders were created for panel order Urinalysis with Reflex Culture and Microscopic.  Procedure                               Abnormality         Status                     ---------                               -----------         ------                     Urinalysis with Reflex C...[[id number]]                                                 Extra Urine Gray Tube[[id number]]                                                         Please view results for these tests on the individual orders.   URINALYSIS WITH REFLEX CULTURE AND MICROSCOPIC   EXTRA URINE GRAY TUBE   POCT PREGNANCY, URINE       No orders to display       ED Course:  Diagnoses as of 03/21/25 2107   Nausea         Disposition   Discharged in stable condition.          Kalpana Oshea PA-C  Emergency Medicine      Kalpana Oshea PA-C  03/21/25 2119    "

## 2025-03-22 LAB
C TRACH RRNA SPEC QL NAA+PROBE: NEGATIVE
HOLD SPECIMEN: NORMAL
N GONORRHOEA DNA SPEC QL PROBE+SIG AMP: NEGATIVE
T VAGINALIS RRNA SPEC QL NAA+PROBE: NEGATIVE

## 2025-03-22 NOTE — DISCHARGE INSTRUCTIONS
You were seen and evaluated in the ED today for nausea.  Pregnancy test was negative.  Urinalysis was negative for urinary tract infection.  You may follow-up with your STI testing results via [employer] [facility] otherwise you will be notified of any abnormal findings.  Referrals were placed for gynecology and primary care for you to establish care.  Otherwise return to the ED with any new or worsening symptoms including but not limited to fevers, chest pain, shortness of breath.

## 2025-05-05 PROBLEM — F32.A DEPRESSION: Status: RESOLVED | Noted: 2024-02-03 | Resolved: 2025-05-05

## 2025-05-05 PROBLEM — Z30.46 ENCOUNTER FOR NEXPLANON REMOVAL: Status: RESOLVED | Noted: 2024-07-31 | Resolved: 2025-05-05

## 2025-05-05 PROBLEM — F43.10 COMPLEX POSTTRAUMATIC STRESS DISORDER: Status: RESOLVED | Noted: 2024-02-03 | Resolved: 2025-05-05
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
## 2025-05-06 ENCOUNTER — SOCIAL WORK (OUTPATIENT)
Dept: PEDIATRICS | Facility: CLINIC | Age: 25
End: 2025-05-06

## 2025-05-06 ENCOUNTER — APPOINTMENT (OUTPATIENT)
Dept: OBSTETRICS AND GYNECOLOGY | Facility: CLINIC | Age: 25
End: 2025-05-06
Payer: COMMERCIAL

## 2025-05-06 VITALS
HEART RATE: 78 BPM | SYSTOLIC BLOOD PRESSURE: 115 MMHG | WEIGHT: 139.1 LBS | DIASTOLIC BLOOD PRESSURE: 71 MMHG | HEIGHT: 67 IN | BODY MASS INDEX: 21.83 KG/M2

## 2025-05-06 DIAGNOSIS — K59.00 CONSTIPATION, UNSPECIFIED CONSTIPATION TYPE: ICD-10-CM

## 2025-05-06 DIAGNOSIS — Z01.419 GYNECOLOGIC EXAM NORMAL: Primary | ICD-10-CM

## 2025-05-06 DIAGNOSIS — R11.0 NAUSEA: ICD-10-CM

## 2025-05-06 DIAGNOSIS — Z71.6 ENCOUNTER FOR TOBACCO USE CESSATION COUNSELING: ICD-10-CM

## 2025-05-06 DIAGNOSIS — Z30.011 BCP (BIRTH CONTROL PILLS) INITIATION: ICD-10-CM

## 2025-05-06 DIAGNOSIS — Z11.3 SCREENING EXAMINATION FOR STI: ICD-10-CM

## 2025-05-06 LAB — PREGNANCY TEST URINE, POC: NEGATIVE

## 2025-05-06 PROCEDURE — 87491 CHLMYD TRACH DNA AMP PROBE: CPT | Performed by: ADVANCED PRACTICE MIDWIFE

## 2025-05-06 PROCEDURE — 87661 TRICHOMONAS VAGINALIS AMPLIF: CPT | Performed by: ADVANCED PRACTICE MIDWIFE

## 2025-05-06 PROCEDURE — 81025 URINE PREGNANCY TEST: CPT | Performed by: ADVANCED PRACTICE MIDWIFE

## 2025-05-06 PROCEDURE — 3008F BODY MASS INDEX DOCD: CPT | Performed by: ADVANCED PRACTICE MIDWIFE

## 2025-05-06 PROCEDURE — 99395 PREV VISIT EST AGE 18-39: CPT | Performed by: ADVANCED PRACTICE MIDWIFE

## 2025-05-06 PROCEDURE — 4004F PT TOBACCO SCREEN RCVD TLK: CPT | Performed by: ADVANCED PRACTICE MIDWIFE

## 2025-05-06 RX ORDER — DIPHENHYDRAMINE HCL 25 MG
4 CAPSULE ORAL AS NEEDED
Qty: 100 EACH | Refills: 2 | Status: SHIPPED | OUTPATIENT
Start: 2025-05-06 | End: 2025-06-05

## 2025-05-06 RX ORDER — LEVONORGESTREL/ETHIN.ESTRADIOL 0.1-0.02MG
1 TABLET ORAL DAILY
Qty: 28 TABLET | Refills: 11 | Status: SHIPPED | OUTPATIENT
Start: 2025-05-06 | End: 2026-05-06

## 2025-05-06 RX ORDER — DOCUSATE SODIUM 100 MG/1
100 CAPSULE, LIQUID FILLED ORAL NIGHTLY PRN
Qty: 30 CAPSULE | Refills: 3 | Status: SHIPPED | OUTPATIENT
Start: 2025-05-06

## 2025-05-06 RX ORDER — LEVONORGESTREL 1.5 MG/1
1.5 TABLET ORAL ONCE
Qty: 1 TABLET | Refills: 0 | Status: SHIPPED | OUTPATIENT
Start: 2025-05-06 | End: 2025-05-06

## 2025-05-06 ASSESSMENT — ENCOUNTER SYMPTOMS
OCCASIONAL FEELINGS OF UNSTEADINESS: 0
DEPRESSION: 0
LOSS OF SENSATION IN FEET: 0
CARDIOVASCULAR NEGATIVE: 0
GASTROINTESTINAL NEGATIVE: 0
ENDOCRINE NEGATIVE: 0
EYES NEGATIVE: 0
ALLERGIC/IMMUNOLOGIC NEGATIVE: 0
CONSTIPATION: 1
PSYCHIATRIC NEGATIVE: 0
RESPIRATORY NEGATIVE: 0
NEUROLOGICAL NEGATIVE: 0
BLOOD IN STOOL: 1
HEMATOLOGIC/LYMPHATIC NEGATIVE: 0
CONSTITUTIONAL NEGATIVE: 0
MUSCULOSKELETAL NEGATIVE: 0

## 2025-05-06 ASSESSMENT — PATIENT HEALTH QUESTIONNAIRE - PHQ9
10. IF YOU CHECKED OFF ANY PROBLEMS, HOW DIFFICULT HAVE THESE PROBLEMS MADE IT FOR YOU TO DO YOUR WORK, TAKE CARE OF THINGS AT HOME, OR GET ALONG WITH OTHER PEOPLE: SOMEWHAT DIFFICULT
1. LITTLE INTEREST OR PLEASURE IN DOING THINGS: SEVERAL DAYS
2. FEELING DOWN, DEPRESSED OR HOPELESS: SEVERAL DAYS
SUM OF ALL RESPONSES TO PHQ9 QUESTIONS 1 AND 2: 2

## 2025-05-06 ASSESSMENT — PAIN SCALES - GENERAL: PAINLEVEL_OUTOF10: 6

## 2025-05-07 LAB
C TRACH RRNA SPEC QL NAA+PROBE: NEGATIVE
N GONORRHOEA DNA SPEC QL PROBE+SIG AMP: NEGATIVE
T VAGINALIS RRNA SPEC QL NAA+PROBE: NEGATIVE

## 2025-05-08 ENCOUNTER — APPOINTMENT (OUTPATIENT)
Dept: OBSTETRICS AND GYNECOLOGY | Facility: CLINIC | Age: 25
End: 2025-05-08
Payer: COMMERCIAL

## 2025-05-09 LAB
HBV SURFACE AG SERPL QL IA: NORMAL
HCV AB SERPL QL IA: NORMAL
HIV 1+2 AB+HIV1 P24 AG SERPL QL IA: NORMAL
T PALLIDUM AB SER QL IA: NEGATIVE

## 2025-05-14 LAB
CYTOLOGY CMNT CVX/VAG CYTO-IMP: NORMAL
LAB AP HPV GENOTYPE QUESTION: YES
LAB AP HPV HR: NORMAL
LAB AP PAP ADDITIONAL TESTS: NORMAL
LABORATORY COMMENT REPORT: NORMAL
LMP START DATE: NORMAL
PATH REPORT.TOTAL CANCER: NORMAL

## 2025-07-02 ENCOUNTER — APPOINTMENT (OUTPATIENT)
Dept: OBSTETRICS AND GYNECOLOGY | Facility: CLINIC | Age: 25
End: 2025-07-02
Payer: COMMERCIAL

## 2025-07-07 ENCOUNTER — APPOINTMENT (OUTPATIENT)
Dept: OBSTETRICS AND GYNECOLOGY | Facility: CLINIC | Age: 25
End: 2025-07-07
Payer: COMMERCIAL

## 2025-07-28 ENCOUNTER — APPOINTMENT (OUTPATIENT)
Dept: OBSTETRICS AND GYNECOLOGY | Facility: CLINIC | Age: 25
End: 2025-07-28
Payer: COMMERCIAL

## 2025-08-25 PROBLEM — Z01.419 GYNECOLOGIC EXAM NORMAL: Status: RESOLVED | Noted: 2025-05-06 | Resolved: 2025-08-25

## 2025-08-26 ENCOUNTER — OFFICE VISIT (OUTPATIENT)
Dept: OBSTETRICS AND GYNECOLOGY | Facility: CLINIC | Age: 25
End: 2025-08-26
Payer: COMMERCIAL

## 2025-08-26 VITALS
SYSTOLIC BLOOD PRESSURE: 108 MMHG | WEIGHT: 143.9 LBS | HEIGHT: 67 IN | DIASTOLIC BLOOD PRESSURE: 66 MMHG | BODY MASS INDEX: 22.58 KG/M2 | HEART RATE: 73 BPM

## 2025-08-26 DIAGNOSIS — N93.9 ABNORMAL UTERINE BLEEDING (AUB): Primary | ICD-10-CM

## 2025-08-26 DIAGNOSIS — Z11.3 SCREENING EXAMINATION FOR STI: ICD-10-CM

## 2025-08-26 DIAGNOSIS — N97.9 FEMALE INFERTILITY, SECONDARY: ICD-10-CM

## 2025-08-26 LAB — PREGNANCY TEST URINE, POC: NEGATIVE

## 2025-08-26 PROCEDURE — 99213 OFFICE O/P EST LOW 20 MIN: CPT

## 2025-08-26 PROCEDURE — 81025 URINE PREGNANCY TEST: CPT | Mod: GC

## 2025-08-26 PROCEDURE — 99212 OFFICE O/P EST SF 10 MIN: CPT

## 2025-08-26 PROCEDURE — 3008F BODY MASS INDEX DOCD: CPT

## 2025-08-26 ASSESSMENT — ENCOUNTER SYMPTOMS
ENDOCRINE NEGATIVE: 0
NEUROLOGICAL NEGATIVE: 0
RESPIRATORY NEGATIVE: 0
MUSCULOSKELETAL NEGATIVE: 0
PSYCHIATRIC NEGATIVE: 0
EYES NEGATIVE: 0
CARDIOVASCULAR NEGATIVE: 0
HEMATOLOGIC/LYMPHATIC NEGATIVE: 0
GASTROINTESTINAL NEGATIVE: 0
ALLERGIC/IMMUNOLOGIC NEGATIVE: 0
CONSTITUTIONAL NEGATIVE: 0

## 2025-08-26 ASSESSMENT — PATIENT HEALTH QUESTIONNAIRE - PHQ9
2. FEELING DOWN, DEPRESSED OR HOPELESS: SEVERAL DAYS
SUM OF ALL RESPONSES TO PHQ9 QUESTIONS 1 AND 2: 1
1. LITTLE INTEREST OR PLEASURE IN DOING THINGS: NOT AT ALL

## 2025-08-26 ASSESSMENT — PAIN SCALES - GENERAL: PAINLEVEL_OUTOF10: 0-NO PAIN

## 2025-08-27 LAB
C TRACH RRNA SPEC QL NAA+PROBE: NOT DETECTED
N GONORRHOEA RRNA SPEC QL NAA+PROBE: NOT DETECTED
QUEST GC CT AMPLIFIED (ALWAYS MESSAGE): NORMAL
T VAGINALIS RRNA SPEC QL NAA+PROBE: NOT DETECTED

## 2025-08-30 LAB
HBV SURFACE AG SERPL QL IA: NORMAL
HCV AB SERPL QL IA: NORMAL
HIV 1+2 AB+HIV1 P24 AG SERPL QL IA: NORMAL
HIV 1+2 AB+HIV1 P24 AG SERPL QL IA: NORMAL
T PALLIDUM AB SER QL IA: NEGATIVE

## 2025-08-31 LAB
17OHP SERPL-MCNC: 34 NG/DL
EST. AVERAGE GLUCOSE BLD GHB EST-MCNC: 105 MG/DL
EST. AVERAGE GLUCOSE BLD GHB EST-SCNC: 5.8 MMOL/L
ESTRADIOL SERPL-MCNC: 45 PG/ML
FSH SERPL-ACNC: 7.8 MIU/ML
HBA1C MFR BLD: 5.3 %
PROLACTIN SERPL-MCNC: 12.1 NG/ML
TESTOST FREE SERPL-MCNC: 2.2 PG/ML (ref 0.1–6.4)
TESTOST SERPL-MCNC: 36 NG/DL (ref 2–45)
TSH SERPL-ACNC: 0.95 MIU/L